# Patient Record
Sex: MALE | Race: WHITE | NOT HISPANIC OR LATINO | Employment: FULL TIME | URBAN - METROPOLITAN AREA
[De-identification: names, ages, dates, MRNs, and addresses within clinical notes are randomized per-mention and may not be internally consistent; named-entity substitution may affect disease eponyms.]

---

## 2017-02-08 ENCOUNTER — TRANSFERRED RECORDS (OUTPATIENT)
Dept: HEALTH INFORMATION MANAGEMENT | Facility: CLINIC | Age: 52
End: 2017-02-08

## 2017-02-15 ENCOUNTER — CARE COORDINATION (OUTPATIENT)
Dept: CARE COORDINATION | Facility: CLINIC | Age: 52
End: 2017-02-15

## 2017-02-15 NOTE — LETTER
[facility]  [address] [place] [address]  [phone]       February 15, 2017      Chas Scherer  [address] [facility] [address]        Dear Chas,    I am a SW Care Coordinator, working with the care team at your clinic including your primary care provider,Tiff Caro.  I have been trying to reach you to introduce you to [facility] Care Coordination Program. The Care Coordinator is a nurse or  who understands the health care system. The goal of Care Coordination is to help you manage your health and improve access to the [facility] system in the most efficient manner.      The registered nurse (RN) assists you in meeting your health care goals by providing education, coordinating services, and strengthening the communication among your providers. The  (SW) is available to assist in financial, behavioral, psychosocial, and chemical dependency and counseling/psychiatric resources.     Please feel free to contact me at [phone]. I look forward to your call and partnering with you to achieve your optimal state of wellness.  We at [facility] are focused on providing you with the highest-quality healthcare experience possible and that all starts with you.       Sincerely,             LUIS A Roland  Care Coordination  [facility] [place]  [phone]  [email or web address]

## 2017-02-15 NOTE — LETTER
My Access Plan    Presenting Problem Signs and Symptoms Treatment Plan    Questions or concerns during clinic hours    I will call the clinic directly:    [facility]:   [address] [place] [address]  [phone]       Questions or concerns outside clinic hours    I will call the 24 hour nurse line at [phone]    Patient needs to schedule an appointment    I will call the 24 hour scheduling team at [phone] or clinic directly at [phone]    Same day treatment     I will call the clinic first, nurse line if after hours, urgent care and express care if needed   Clinic Care Coordinators (RN/Social Work):     LUIS A Guzmán RN RN: [phone]    LUIS A Roland     [phone]       Crisis Services: Behavioral or Mental Health    Crisis Connection (24/7): [phone]    [facility] Crisis Response:  [phone]     Emergency treatment--Immediately    CAll 911

## 2017-02-15 NOTE — PROGRESS NOTES
Clinic Care Coordination Contact  [facility]/Voicemail    Referral Source: Non-[facility]  Clinical Data: Care Coordinator Outreach  Outreach attempted x 1.  Left message on voicemail with call back information and requested return call.  Plan: Care Coordinator will mail out care coordination introduction letter with care coordinator contact information and explanation of care coordination services. Care Coordinator will try to reach patient again in 3-5 business days.    LUIS A Roland  Care Coordination  [facility] [place]  [phone]  [email or web address]

## 2017-04-12 ENCOUNTER — MYC REFILL (OUTPATIENT)
Dept: FAMILY MEDICINE | Facility: CLINIC | Age: 52
End: 2017-04-12

## 2017-04-12 DIAGNOSIS — G62.1 ALCOHOL-INDUCED POLYNEUROPATHY (H): ICD-10-CM

## 2017-04-12 DIAGNOSIS — I10 BENIGN ESSENTIAL HYPERTENSION: ICD-10-CM

## 2017-04-12 DIAGNOSIS — G89.29 CHRONIC LOW BACK PAIN WITHOUT SCIATICA, UNSPECIFIED BACK PAIN LATERALITY: ICD-10-CM

## 2017-04-12 DIAGNOSIS — F43.22 ADJUSTMENT DISORDER WITH ANXIOUS MOOD: ICD-10-CM

## 2017-04-12 DIAGNOSIS — M54.50 CHRONIC LOW BACK PAIN WITHOUT SCIATICA, UNSPECIFIED BACK PAIN LATERALITY: ICD-10-CM

## 2017-04-12 DIAGNOSIS — R05.9 COUGH: ICD-10-CM

## 2017-04-12 RX ORDER — LISINOPRIL AND HYDROCHLOROTHIAZIDE 12.5; 2 MG/1; MG/1
1 TABLET ORAL DAILY
Qty: 90 TABLET | Refills: 0 | Status: SHIPPED | OUTPATIENT
Start: 2017-04-12 | End: 2017-06-13

## 2017-04-12 RX ORDER — GABAPENTIN 300 MG/1
CAPSULE ORAL
Qty: 360 CAPSULE | Refills: 1 | Status: SHIPPED | OUTPATIENT
Start: 2017-04-12 | End: 2017-09-06

## 2017-04-12 RX ORDER — BUPROPION HYDROCHLORIDE 150 MG/1
TABLET, EXTENDED RELEASE ORAL
Qty: 180 TABLET | Refills: 0 | Status: CANCELLED | OUTPATIENT
Start: 2017-04-12

## 2017-04-12 RX ORDER — CYCLOBENZAPRINE HCL 10 MG
10 TABLET ORAL
Qty: 30 TABLET | Refills: 0 | Status: SHIPPED | OUTPATIENT
Start: 2017-04-12 | End: 2017-07-20

## 2017-04-12 NOTE — TELEPHONE ENCOUNTER
Message from [employer]:  Original authorizing provider: KAMILLA CONDON NP, APRN CNP    Chas Scherer would like a refill of the following medications:  buPROPion (WELLBUTRIN SR) 150 MG 12 hr tablet [KAMILLA CONDON NP, APRN CNP]    [facility] [place] [address] [facility] [address]    Comment:      Medication renewals requested in this message routed to other providers:  lisinopril-hydrochlorothiazide (PRINZIDE,ZESTORETIC) 20-12.5 MG per tablet [Ke Hope PA-C]  cyclobenzaprine (FLEXERIL) 10 MG tablet [Ke Hope PA-C]  gabapentin (NEURONTIN) 300 MG capsule [Pako Jenkins MD]  albuterol (PROAIR HFA, PROVENTIL HFA, VENTOLIN HFA) 108 (90 BASE) MCG/ACT inhaler [Joann Mcgill DO]

## 2017-04-12 NOTE — TELEPHONE ENCOUNTER
Lisinopril-HCTZ refill request  Flexeril refill request  Last OV Ke Hope PA-C: 10/2016 for low back pain (chronic condition).  Had had physical 6/2016      Potassium   Date Value Ref Range Status   06/17/2016 3.8 3.4 - 5.3 mmol/L Final     Creatinine   Date Value Ref Range Status   06/17/2016 0.92 0.66 - 1.25 mg/dL Final     BP Readings from Last 3 Encounters:   11/12/16 132/70   10/06/16 138/84   09/13/16 (!) 144/92

## 2017-04-12 NOTE — TELEPHONE ENCOUNTER
[facility]:  Original authorizing provider: MD Chas Martínez would like a refill of the following medications:  gabapentin (NEURONTIN) 300 MG capsule [Pako Jenkins MD]    Preferred pharmacy: [place] [address] [address] [facility], [address]    Comment:      Medication renewals requested in this message routed to other providers:  lisinopril-hydrochlorothiazide (PRINZIDE,ZESTORETIC) 20-12.5 MG per tablet [Ke Hope PA-C]  cyclobenzaprine (FLEXERIL) 10 MG tablet [Ke Hope PA-C]  buPROPion (WELLBUTRIN SR) 150 MG 12 hr tablet [KAMILLA CONDON NP, APRN CNP]  albuterol (PROAIR HFA, PROVENTIL HFA, VENTOLIN HFA) 108 (90 BASE) MCG/ACT inhaler [Joann Mcgill DO]

## 2017-04-12 NOTE — TELEPHONE ENCOUNTER
Message from [employer]:  Original authorizing provider: DO Chas [place] would like a refill of the following medications:  albuterol (PROAIR HFA, PROVENTIL HFA, VENTOLIN HFA) 108 (90 BASE) MCG/ACT inhaler [Joann Mcgill DO]    Preferred pharmacy: [address], [address]    Comment:      Medication renewals requested in this message routed to other providers:  lisinopril-hydrochlorothiazide (PRINZIDE,ZESTORETIC) 20-12.5 MG per tablet [Ke Hope PA-C]  cyclobenzaprine (FLEXERIL) 10 MG tablet [Ke Hope PA-C]  buPROPion (WELLBUTRIN SR) 150 MG 12 hr tablet [KAMILLA CONDON NP, APRN CNP]  gabapentin (NEURONTIN) 300 MG capsule [Pako Jenkins MD]

## 2017-04-12 NOTE — TELEPHONE ENCOUNTER
Message from [employer]:  Original authorizing provider: NAUN Muñiz would like a refill of the following medications:  lisinopril-hydrochlorothiazide (PRINZIDE,ZESTORETIC) 20-12.5 MG per tablet [Ke Hope PA-C]  cyclobenzaprine (FLEXERIL) 10 MG tablet [Ke Hope PA-C]    Preferred pharmacy: [place] [address] [address] [facility], [address]    Comment:      Medication renewals requested in this message routed to other providers:  buPROPion (WELLBUTRIN SR) 150 MG 12 hr tablet [KAMILLA CONDON NP, APRN CNP]  gabapentin (NEURONTIN) 300 MG capsule [Pako Jenkins MD]  albuterol (PROAIR HFA, PROVENTIL HFA, VENTOLIN HFA) 108 (90 BASE) MCG/ACT inhaler [Joann Mcgill DO]

## 2017-04-13 RX ORDER — BUPROPION HYDROCHLORIDE 150 MG/1
TABLET, EXTENDED RELEASE ORAL
Qty: 180 TABLET | Refills: 0 | OUTPATIENT
Start: 2017-04-13

## 2017-04-13 RX ORDER — ALBUTEROL SULFATE 90 UG/1
2 AEROSOL, METERED RESPIRATORY (INHALATION) EVERY 6 HOURS PRN
Qty: 1 INHALER | Refills: 0 | Status: SHIPPED | OUTPATIENT
Start: 2017-04-13 | End: 2021-03-25

## 2017-04-18 ENCOUNTER — TELEPHONE (OUTPATIENT)
Dept: FAMILY MEDICINE | Facility: CLINIC | Age: 52
End: 2017-04-18

## 2017-04-18 NOTE — TELEPHONE ENCOUNTER
Panel Management Review      Patient has the following on his problem list: None      Composite cancer screening  Chart review shows that this patient is due/due soon for the following Colonoscopy  Summary:    Patient is due/failing the following:   COLONOSCOPY    Action needed:   Patient needs referral/order: colon    Type of outreach:    Sent letter.    Questions for provider review:    None                                                                                                                                    CARLOS ALBERTO Denton   10:16 AM  4/18/2017       Chart routed to closed .

## 2017-04-18 NOTE — LETTER
[facility]  [address] [facility] [address]  Phone: [phone]    04/18/17    Chas Scherer  [address] [facility] [address]      To whom it may concern:     Our records indicate that you have not scheduled for a(n)colonoscopy which was recommended by your health care team. Monitoring and managing your preventative and chronic health conditions are very important to us.     If you have received your health care elsewhere, please provide us with that information so it can be documented in your chart.    Please call [phone] or message us through your [employer] account to schedule an appointment or provide information for your chart.     I look forward to seeing you and working with you on your health care needs.       *If you have already scheduled an appointment, please disregard this reminder    Sincerely,      Ke Hope PA-C

## 2017-05-26 ENCOUNTER — TELEPHONE (OUTPATIENT)
Dept: CARE COORDINATION | Facility: CLINIC | Age: 52
End: 2017-05-26

## 2017-05-26 ENCOUNTER — CARE COORDINATION (OUTPATIENT)
Dept: CARE COORDINATION | Facility: CLINIC | Age: 52
End: 2017-05-26

## 2017-05-26 DIAGNOSIS — F10.939 ALCOHOL WITHDRAWAL (H): Primary | ICD-10-CM

## 2017-05-26 NOTE — LETTER
[facility]  [address] [place] [address]  [phone]   May 26, 2017      Chas Scherer  [address] [facility] [address]        Dear Chas,    I am a SW Care Coordinator, working with the care team at your clinic including your primary care provider,Tiff Caro.  I have been trying to reach you to introduce you to [facility] Care Coordination Program. The Care Coordinator is a nurse or  who understands the health care system. The goal of Care Coordination is to help you manage your health and improve access to the [facility] system in the most efficient manner.      The registered nurse (RN) assists you in meeting your health care goals by providing education, coordinating services, and strengthening the communication among your providers. The  (SW) is available to assist in financial, behavioral, psychosocial, and chemical dependency and counseling/psychiatric resources.     Please feel free to contact me at [phone]. I look forward to your call and partnering with you to achieve your optimal state of wellness.  We at [facility] are focused on providing you with the highest-quality healthcare experience possible and that all starts with you.       Sincerely,         LUIS A Roland  Care Coordination  [facility] [place]  [phone]  [email or web address]

## 2017-05-26 NOTE — TELEPHONE ENCOUNTER
DC'd from [facility] on 5/25 to Rehab facility  Primary Problem: Alcohol withdrawal  LOS: 1.8

## 2017-05-26 NOTE — PROGRESS NOTES
Clinic Care Coordination Contact  [facility]/Voicemail    Referral Source: Non-[facility]  Clinical Data: Care Coordinator Outreach  Outreach attempted x 1.  Left message on voicemail with call back information and requested return call.  Plan: Care Coordinator mailed out care coordination introduction letter on 2/2017, welcome packet will be mailed again. Care Coordinator will try to reach patient again in 3-5 business days.    LUIS A Roland  Care Coordination  [place]  [phone]  [email or web address]

## 2017-06-07 ENCOUNTER — CARE COORDINATION (OUTPATIENT)
Dept: CARE COORDINATION | Facility: CLINIC | Age: 52
End: 2017-06-07

## 2017-06-07 ENCOUNTER — TELEPHONE (OUTPATIENT)
Dept: CARE COORDINATION | Facility: CLINIC | Age: 52
End: 2017-06-07

## 2017-06-07 DIAGNOSIS — F10.10 ALCOHOL ABUSE: Primary | ICD-10-CM

## 2017-06-07 NOTE — PROGRESS NOTES
Clinic Care Coordination Contact  Post-Hospital Follow up    SW contacted pt following recent hospital stay for alcohol abuse. Pt has been contacted previously on both 2/15 and 5/26 for d/c follow up with the same diagnosis.     Pt stated he understood his discharge instructions and did not need care coordination at this time. Pt is asked for assistance with getting an appointment with provider.    SW will forward request and be available if pt has needs going forward.    LUIS A Roland  Care Coordination  [place]  [phone]  [email or web address]

## 2017-06-07 NOTE — TELEPHONE ENCOUNTER
DC'd from [facility] on 6/6 to home  Primary Problem: Alcohol abuse  LOS: 12.2

## 2017-06-08 NOTE — PROGRESS NOTES
Called and spoke to patient. Appointment made for next week with Ke Hope.Princess Molina MA/LINDY

## 2017-06-13 DIAGNOSIS — I10 BENIGN ESSENTIAL HYPERTENSION: ICD-10-CM

## 2017-06-14 ENCOUNTER — OFFICE VISIT (OUTPATIENT)
Dept: FAMILY MEDICINE | Facility: CLINIC | Age: 52
End: 2017-06-14
Payer: MEDICAID

## 2017-06-14 ENCOUNTER — TELEPHONE (OUTPATIENT)
Dept: BEHAVIORAL HEALTH | Facility: CLINIC | Age: 52
End: 2017-06-14

## 2017-06-14 VITALS
BODY MASS INDEX: 42.66 KG/M2 | HEIGHT: 72 IN | OXYGEN SATURATION: 100 % | SYSTOLIC BLOOD PRESSURE: 120 MMHG | HEART RATE: 68 BPM | WEIGHT: 315 LBS | DIASTOLIC BLOOD PRESSURE: 81 MMHG

## 2017-06-14 DIAGNOSIS — F10.10 ALCOHOL ABUSE: Primary | ICD-10-CM

## 2017-06-14 DIAGNOSIS — F43.22 ADJUSTMENT DISORDER WITH ANXIOUS MOOD: ICD-10-CM

## 2017-06-14 DIAGNOSIS — K21.9 GASTROESOPHAGEAL REFLUX DISEASE, ESOPHAGITIS PRESENCE NOT SPECIFIED: ICD-10-CM

## 2017-06-14 DIAGNOSIS — G47.00 INSOMNIA, UNSPECIFIED TYPE: ICD-10-CM

## 2017-06-14 PROCEDURE — 99214 OFFICE O/P EST MOD 30 MIN: CPT | Performed by: PHYSICIAN ASSISTANT

## 2017-06-14 RX ORDER — TRAZODONE HYDROCHLORIDE 50 MG/1
50 TABLET, FILM COATED ORAL
Qty: 30 TABLET | Refills: 1 | Status: SHIPPED | OUTPATIENT
Start: 2017-06-14 | End: 2017-09-01

## 2017-06-14 RX ORDER — PANTOPRAZOLE SODIUM 40 MG/1
40 TABLET, DELAYED RELEASE ORAL DAILY
Qty: 30 TABLET | Refills: 1 | Status: SHIPPED | OUTPATIENT
Start: 2017-06-14 | End: 2017-09-01

## 2017-06-14 RX ORDER — LISINOPRIL AND HYDROCHLOROTHIAZIDE 12.5; 2 MG/1; MG/1
1 TABLET ORAL DAILY
Qty: 90 TABLET | Refills: 1 | Status: SHIPPED | OUTPATIENT
Start: 2017-06-14 | End: 2017-11-29

## 2017-06-14 RX ORDER — DISULFIRAM 500 MG/1
500 TABLET ORAL
COMMUNITY
Start: 2017-06-06 | End: 2017-07-06

## 2017-06-14 ASSESSMENT — ANXIETY QUESTIONNAIRES
7. FEELING AFRAID AS IF SOMETHING AWFUL MIGHT HAPPEN: SEVERAL DAYS
2. NOT BEING ABLE TO STOP OR CONTROL WORRYING: NEARLY EVERY DAY
GAD7 TOTAL SCORE: 13
3. WORRYING TOO MUCH ABOUT DIFFERENT THINGS: NEARLY EVERY DAY
5. BEING SO RESTLESS THAT IT IS HARD TO SIT STILL: SEVERAL DAYS
6. BECOMING EASILY ANNOYED OR IRRITABLE: NEARLY EVERY DAY
IF YOU CHECKED OFF ANY PROBLEMS ON THIS QUESTIONNAIRE, HOW DIFFICULT HAVE THESE PROBLEMS MADE IT FOR YOU TO DO YOUR WORK, TAKE CARE OF THINGS AT HOME, OR GET ALONG WITH OTHER PEOPLE: EXTREMELY DIFFICULT
1. FEELING NERVOUS, ANXIOUS, OR ON EDGE: SEVERAL DAYS

## 2017-06-14 ASSESSMENT — PATIENT HEALTH QUESTIONNAIRE - PHQ9: 5. POOR APPETITE OR OVEREATING: SEVERAL DAYS

## 2017-06-14 NOTE — TELEPHONE ENCOUNTER
[facility]  No Data Recorded    Social Work Care Navigator Note    Patient: Chas Scherer  Date: June 14, 2017  Preferred Name: Chas    Previous PHQ-9:   PHQ-9 SCORE 3/7/2016 6/14/2017   Total Score 3 9     Previous AGNES-7:   AGNES-7 SCORE 4/14/2014 7/1/2014 6/14/2017   Total Score 3 1 -   Total Score - - 13     NELIDA LEVEL:  NELIDA Score (Last Two) 4/12/2011   NELIDA Raw Score 44   Activation Score 70.8   NELIDA Level 4     Preferred Contact:  No Data Recorded    Type of Contact Today: Phone call (not reached/unavailable)    Data: (subjective / Objective):      Patient PCP approached writer with patient as warm hand off and discussed concerns. Writer attempted to contact patient to discuss [facility] service and how I could be of help, writer left VM requesting phone call back & will continue to do outreach or attempt to connect with patient at next clinic encounter.     Agustin Conti, CASSIEW, SWCC

## 2017-06-14 NOTE — PROGRESS NOTES
SUBJECTIVE:                                                    Chas Scherer is a 51 year old male who presents to clinic today for the following health issues:        Hospital Follow-up Visit:    Hospital/Nursing Home/IP Rehab Facility: [facility]   Date of Admission: 5/23/17  Date of Discharge: 6/2/17?  Reason(s) for Admission: alcohol withdrawal, seizure. Was placed in detox/treatment program             Problems taking medications regularly:  None       Medication changes since discharge: see updated med list. Pt was prescribed Trazodone and Pantoprazole but was not given a prescription for these        Problems adhering to non-medication therapy:  None except the two listed above that he does not have     Summary of hospitalization:  [facility] discharge summary reviewed  CareEverywhere information obtained and reviewed    DISCHARGE SUMMARY    Transfer 2 east 5/25/17.  ADMIT DATE: 05/23/2017     HISTORY OF PRESENT ILLNESS   This is a 51-year-old male who presents for detox. The patient has a history of chronic alcohol abuse. He recently was discharged from treatment. Was sober until the next day and then started drinking. He has been drinking up to a fifth of vodka a day for about the last 2 weeks. This morning he found himself on the bathroom floor unaware of how he got there. He has a history of withdrawal seizures. He is interested in detox and quitting drinking. He has been on treatment on 3 occasions in the past. He has noted a little bit of chills and sweats, occasional heartburn, and lower extremity edema, but otherwise denies any other complaints or problems.     Subjective: Shaky, history withdrawal seizures. Found self BR floor, unsure how he got there. Came in for detox.      + heartburn last night, tums helped -better. No chest pain, shortness of breath, nausea or vomiting, abdominal pain, bowel or bladder complaints, No new complaints. I have personally reviewed chart to date and all  pertinent old records.     Exam:     Temp (24hrs), Av.2  F (36.8  C), Min:97.7  F (36.5  C), Max:98.9  F (37.2  C)  /90  Pulse 71  Temp 97.8  F (36.6  C)  Resp 20  Ht 1.829 m (6')  Wt (!) 146.8 kg (323 lb 9.6 oz)  SpO2 98%  BMI 43.89 kg/m2  Body mass index is 44.08 kg/(m^2)., Estimated Creatinine Clearance: 162.9 mL/min (by C-G formula based on Cr of 0.79).     Weight:  First encounter weight: Wt.: (!) 147.4 kg (325 lb) (17 1630) Weight Method: STATED (17 1630)       Patient Vitals for the past 48 hrs:    Weight   17 0638 (!) 146.8 kg (323 lb 9.6 oz)   17 2130 (!) 145.1 kg (319 lb 12.8 oz)   17 1630 (!) 147.4 kg (325 lb)       Diagnostic Tests/Treatments reviewed.  Follow up needed: none  Other Healthcare Providers Involved in Patient s Care:         None  Update since discharge: stable.       Post Discharge Medication Reconciliation: discharge medications reconciled and changed, per note/orders (see AVS).  Plan of care communicated with patient     Coding guidelines for this visit:  Type of Medical   Decision Making Face-to-Face Visit       within 7 Days of discharge Face-to-Face Visit        within 14 days of discharge   Moderate Complexity [id number]   High Complexity [id number]            Problem list and histories reviewed & adjusted, as indicated.  Additional history: as documented    States he lost his job in Nov and increase depression and ETOH usage. Been in ED x2 in the last 1 month. Has started out patient tx last Monday.     Patient Active Problem List   Diagnosis     Gastric bypass status for obesity     Shoulder impingement syndrome     CARDIOVASCULAR SCREENING; LDL GOAL LESS THAN 130     Tobacco abuse     Other joint derangement, not elsewhere classified, shoulder region     Labral tear of shoulder     Stress at work     Generalized anxiety disorder     health care home     Left ankle pain     Hypertension goal BP (blood pressure) < 140/90     Heel spur      Advanced directives, counseling/discussion     Adjustment disorder with anxious mood     Benign essential hypertension     Chest pain, unspecified type     Pulmonary nodules     Alcohol abuse     Chronic low back pain without sciatica, unspecified back pain laterality     Alcohol-induced polyneuropathy (H)     Chronic midline low back pain without sciatica     BMI 40.0-44.9, adult (H)     Gastroesophageal reflux disease, esophagitis presence not specified     Insomnia, unspecified type     No past surgical history on file.    Social History   Substance Use Topics     Smoking status: Current Some Day Smoker     Packs/day: 0.50     Years: 5.00     Types: Cigarettes     Smokeless tobacco: Former User     Quit date: 2/2/2014     Alcohol use No      Comment: quit 9-2009     Family History   Problem Relation Age of Onset     Arthritis Mother      CANCER Mother      Lung ca     Alcohol/Drug Father      DIABETES Brother      Alcohol/Drug Brother      GASTROINTESTINAL DISEASE Brother      CANCER Brother      CANCER Brother      Lung ca     Obesity Brother          Current Outpatient Prescriptions   Medication Sig Dispense Refill     Disulfiram 500 MG TABS Take 500 mg by mouth       THIAMINE HCL PO Take 50 mg by mouth daily       pantoprazole (PROTONIX) 40 MG EC tablet Take 1 tablet (40 mg) by mouth daily Take 30-60 minutes before a meal. 30 tablet 1     traZODone (DESYREL) 50 MG tablet Take 1 tablet (50 mg) by mouth nightly as needed for sleep 30 tablet 1     sertraline (ZOLOFT) 50 MG tablet Take 1 tablet (50 mg) by mouth daily 90 tablet 1     lisinopril-hydrochlorothiazide (PRINZIDE/ZESTORETIC) 20-12.5 MG per tablet Take 1 tablet by mouth daily 90 tablet 0     cyclobenzaprine (FLEXERIL) 10 MG tablet Take 1 tablet (10 mg) by mouth nightly as needed for muscle spasms 30 tablet 0     gabapentin (NEURONTIN) 300 MG capsule One tab at bedtime x2 days, then one tab twice daily x2 days, then 2 tabs at bedtime and one tab in am.  (Patient taking differently: 2 tab 3 times a day) 360 capsule 1     Cholecalciferol (VITAMIN D PO) Take 1 tablet by mouth.       cyanocolbalamin (VITAMIN B-12) 500 MCG tablet Take 500 mcg by mouth daily.       albuterol (PROAIR HFA/PROVENTIL HFA/VENTOLIN HFA) 108 (90 BASE) MCG/ACT Inhaler Inhale 2 puffs into the lungs every 6 hours as needed for shortness of breath / dyspnea or wheezing Needs to be seen before next refill (Patient not taking: Reported on 6/14/2017) 1 Inhaler 0     No Known Allergies  BP Readings from Last 3 Encounters:   11/12/16 132/70   10/06/16 138/84   09/13/16 (!) 144/92    Wt Readings from Last 3 Encounters:   11/12/16 (!) 324 lb (147 kg)   10/06/16 (!) 324 lb 9.6 oz (147.2 kg)   09/13/16 (!) 331 lb (150.1 kg)                  Labs reviewed in EPIC    ROS:  Constitutional, HEENT, cardiovascular, pulmonary, gi and gu systems are negative, except as otherwise noted.    OBJECTIVE:                                                    There were no vitals taken for this visit.  There is no height or weight on file to calculate BMI.  GENERAL: healthy, alert and no distress  Head: Normocephalic, atraumatic.  Eyes: Conjunctiva clear, non icteric. PERRLA.  Ears: External ears and TMs normal BL.  Nose: Septum midline, nasal mucosa pink and moist. No discharge.  Mouth / Throat: Normal dentition.  No oral lesions. Pharynx non erythematous, tonsils without hypertrophy.  Neck: Supple, no enlarged LN, trachea midline.  Heart: S1 and S2 normal, no murmurs, clicks, gallops or rubs. Regular rate and rhythm. Chest: Clear; no wheezes or rales.  The abdomen is soft without tenderness, guarding, mass, rebound or organomegaly. Bowel sounds are normal.    Diagnostic Test Results:  none      ASSESSMENT/PLAN:                                                          ICD-10-CM    1. Alcohol abuse F10.10 CARE COORDINATION REFERRAL     MENTAL HEALTH REFERRAL   2. Adjustment disorder with anxious mood F43.22 sertraline  (ZOLOFT) 50 MG tablet     CARE COORDINATION REFERRAL     MENTAL HEALTH REFERRAL   3. Gastroesophageal reflux disease, esophagitis presence not specified K21.9 pantoprazole (PROTONIX) 40 MG EC tablet   4. Insomnia, unspecified type G47.00 traZODone (DESYREL) 50 MG tablet     SLEEP EVALUATION & MANAGEMENT REFERRAL - ADULT     MENTAL HEALTH REFERRAL   5. BMI 40.0-44.9, adult (H) Z68.41 SLEEP EVALUATION & MANAGEMENT REFERRAL - ADULT   1. Needs cont follow up  For compliance of care. Needs to follow up  With psychiatry or provider that RX antabuse that he is currently taking  2. Start zoloft. warning signs discussed. side effects discussed- recheck 4 wks.  3. Start protonix. warning signs discussed. side effects discussed  4-5. Follow up  With sleep medicine.     Ke Hope PA-C  [facility]

## 2017-06-14 NOTE — MR AVS SNAPSHOT
After Visit Summary   6/14/2017    Chas Scherer    MRN: [id number]           Patient Information     Date Of Birth          1965        Visit Information        Provider Department      6/14/2017 8:00 AM Ke Hope PA-C [facility]        Today's Diagnoses     Alcohol abuse    -  1    Adjustment disorder with anxious mood        Gastroesophageal reflux disease, esophagitis presence not specified        Insomnia, unspecified type        BMI 40.0-44.9, adult (H)           Follow-ups after your visit        Additional Services     CARE COORDINATION REFERRAL       Services are provided by a Care Coordinator for people with complex needs such as: medical, social, or financial troubles.  The Care Coordinator works with the patient and their Primary Care Provider to determine health goals, obtain resources, achieve outcomes, and develop care plans that help coordinate the patient's care.     Reason for Referral: Chemical Dependence: Other: ETOH    Provide additional details for Care Coordination to best meet the patient's current needs:     Clinical Staff have discussed the Care Coordination Referral with the patient and/or caregiver: yes            SLEEP EVALUATION & MANAGEMENT REFERRAL - ADULT       Please be aware that coverage of these services is subject to the terms and limitations of your health insurance plan.  Call member services at your health plan with any benefit or coverage questions.      Please bring the following to your appointment:    >>   List of current medications   >>   This referral request   >>   Any documents/labs given to you for this referral    [facility] [phone] (Age 15 and up)                  Follow-up notes from your care team     Return in about 4 weeks (around 7/12/2017) for depression check up.      Future tests that were ordered for you today     Open Future Orders        Priority Expected Expires Ordered     SLEEP EVALUATION & MANAGEMENT REFERRAL - ADULT Routine  6/14/2018 6/14/2017            Who to contact     If you have questions or need follow up information about today's clinic visit or your schedule please contact [facility] directly at [phone].  Normal or non-critical lab and imaging results will be communicated to you by MyChart, letter or phone within 4 business days after the clinic has received the results. If you do not hear from us within 7 days, please contact the clinic through [employer] or phone. If you have a critical or abnormal lab result, we will notify you by phone as soon as possible.  Submit refill requests through [employer] or call your pharmacy and they will forward the refill request to us. Please allow 3 business days for your refill to be completed.          Additional Information About Your Visit        [employer] Information     [employer] gives you secure access to your electronic health record. If you see a primary care provider, you can also send messages to your care team and make appointments. If you have questions, please call your primary care clinic.  If you do not have a primary care provider, please call [phone] and they will assist you.        Care EveryWhere ID     This is your Care EveryWhere ID. This could be used by other organizations to access your [facility] medical records  [id number]         Blood Pressure from Last 3 Encounters:   11/12/16 132/70   10/06/16 138/84   09/13/16 (!) 144/92    Weight from Last 3 Encounters:   11/12/16 (!) 324 lb (147 kg)   10/06/16 (!) 324 lb 9.6 oz (147.2 kg)   09/13/16 (!) 331 lb (150.1 kg)              We Performed the Following     CARE COORDINATION REFERRAL          Today's Medication Changes          These changes are accurate as of: 6/14/17  8:03 AM.  If you have any questions, ask your nurse or doctor.               Start taking these medicines.        Dose/Directions    pantoprazole 40 MG EC tablet   Commonly known  as:  PROTONIX   Used for:  Gastroesophageal reflux disease, esophagitis presence not specified        Dose:  40 mg   Take 1 tablet (40 mg) by mouth daily Take 30-60 minutes before a meal.   Quantity:  30 tablet   Refills:  1       sertraline 50 MG tablet   Commonly known as:  ZOLOFT   Used for:  Adjustment disorder with anxious mood        Dose:  50 mg   Take 1 tablet (50 mg) by mouth daily   Quantity:  90 tablet   Refills:  1       traZODone 50 MG tablet   Commonly known as:  DESYREL   Used for:  Insomnia, unspecified type        Dose:  50 mg   Take 1 tablet (50 mg) by mouth nightly as needed for sleep   Quantity:  30 tablet   Refills:  1         These medicines have changed or have updated prescriptions.        Dose/Directions    gabapentin 300 MG capsule   Commonly known as:  NEURONTIN   This may have changed:  additional instructions   Used for:  Alcohol-induced polyneuropathy (H)        One tab at bedtime x2 days, then one tab twice daily x2 days, then 2 tabs at bedtime and one tab in am.   Quantity:  360 capsule   Refills:  1            Where to get your medicines      These medications were sent to [facility] [address] [facility]  [address] [place] [address]     Phone:  [phone]     pantoprazole 40 MG EC tablet    sertraline 50 MG tablet    traZODone 50 MG tablet                Primary Care Provider Office Phone # Fax #    Ke Hope PA-C [phone]       [facility] [address] [facility] [address]        Thank you!     Thank you for choosing [facility]  for your care. Our goal is always to provide you with excellent care. Hearing back from our patients is one way we can continue to improve our services. Please take a few minutes to complete the written survey that you may receive in the mail after your visit with us. Thank you!             Your Updated Medication List -  Protect others around you: Learn how to safely use, store and throw away your medicines at www.disposemymeds.org.          This list is accurate as of: 6/14/17  8:03 AM.  Always use your most recent med list.                   Brand Name Dispense Instructions for use    albuterol 108 (90 BASE) MCG/ACT Inhaler    PROAIR HFA/PROVENTIL HFA/VENTOLIN HFA    1 Inhaler    Inhale 2 puffs into the lungs every 6 hours as needed for shortness of breath / dyspnea or wheezing Needs to be seen before next refill       cyanocolbalamin 500 MCG tablet    vitamin  B-12     Take 500 mcg by mouth daily.       cyclobenzaprine 10 MG tablet    FLEXERIL    30 tablet    Take 1 tablet (10 mg) by mouth nightly as needed for muscle spasms       Disulfiram 500 MG Tabs      Take 500 mg by mouth       gabapentin 300 MG capsule    NEURONTIN    360 capsule    One tab at bedtime x2 days, then one tab twice daily x2 days, then 2 tabs at bedtime and one tab in am.       lisinopril-hydrochlorothiazide 20-12.5 MG per tablet    PRINZIDE/ZESTORETIC    90 tablet    Take 1 tablet by mouth daily       pantoprazole 40 MG EC tablet    PROTONIX    30 tablet    Take 1 tablet (40 mg) by mouth daily Take 30-60 minutes before a meal.       sertraline 50 MG tablet    ZOLOFT    90 tablet    Take 1 tablet (50 mg) by mouth daily       THIAMINE HCL PO      Take 50 mg by mouth daily       traZODone 50 MG tablet    DESYREL    30 tablet    Take 1 tablet (50 mg) by mouth nightly as needed for sleep       VITAMIN D PO      Take 1 tablet by mouth.

## 2017-06-14 NOTE — NURSING NOTE
Chief Complaint   Patient presents with     Hospital F/U       Initial /81  Pulse 68  Ht 6' (1.829 m)  Wt (!) 323 lb (146.5 kg)  SpO2 100%  BMI 43.81 kg/m2 Estimated body mass index is 43.81 kg/(m^2) as calculated from the following:    Height as of this encounter: 6' (1.829 m).    Weight as of this encounter: 323 lb (146.5 kg).  Medication Reconciliation: feliz Gregg, CMA

## 2017-06-15 ASSESSMENT — PATIENT HEALTH QUESTIONNAIRE - PHQ9: SUM OF ALL RESPONSES TO PHQ QUESTIONS 1-9: 9

## 2017-06-15 ASSESSMENT — ANXIETY QUESTIONNAIRES: GAD7 TOTAL SCORE: 13

## 2017-07-10 PROBLEM — M10.9 ACUTE GOUTY ARTHRITIS: Status: RESOLVED | Noted: 2017-07-10 | Resolved: 2017-07-10

## 2017-07-10 PROBLEM — M10.9 ACUTE GOUTY ARTHRITIS: Status: ACTIVE | Noted: 2017-07-10

## 2017-07-20 DIAGNOSIS — M54.50 CHRONIC LOW BACK PAIN WITHOUT SCIATICA, UNSPECIFIED BACK PAIN LATERALITY: ICD-10-CM

## 2017-07-20 DIAGNOSIS — G89.29 CHRONIC LOW BACK PAIN WITHOUT SCIATICA, UNSPECIFIED BACK PAIN LATERALITY: ICD-10-CM

## 2017-07-20 RX ORDER — CYCLOBENZAPRINE HCL 10 MG
10 TABLET ORAL
Qty: 30 TABLET | Refills: 0 | Status: SHIPPED | OUTPATIENT
Start: 2017-07-20 | End: 2017-07-21

## 2017-07-21 RX ORDER — CYCLOBENZAPRINE HCL 10 MG
10 TABLET ORAL
Qty: 30 TABLET | Refills: 0 | Status: SHIPPED | OUTPATIENT
Start: 2017-07-21 | End: 2017-09-18

## 2017-07-31 ENCOUNTER — OFFICE VISIT (OUTPATIENT)
Dept: FAMILY MEDICINE | Facility: CLINIC | Age: 52
End: 2017-07-31

## 2017-07-31 VITALS
TEMPERATURE: 97.7 F | SYSTOLIC BLOOD PRESSURE: 127 MMHG | OXYGEN SATURATION: 100 % | WEIGHT: 311 LBS | HEART RATE: 79 BPM | DIASTOLIC BLOOD PRESSURE: 86 MMHG | BODY MASS INDEX: 42.12 KG/M2 | HEIGHT: 72 IN

## 2017-07-31 DIAGNOSIS — M25.551 HIP PAIN, RIGHT: Primary | ICD-10-CM

## 2017-07-31 PROCEDURE — 99213 OFFICE O/P EST LOW 20 MIN: CPT | Performed by: FAMILY MEDICINE

## 2017-07-31 RX ORDER — OXYCODONE AND ACETAMINOPHEN 5; 325 MG/1; MG/1
1-2 TABLET ORAL EVERY 6 HOURS PRN
COMMUNITY
End: 2017-07-31

## 2017-07-31 RX ORDER — OXYCODONE AND ACETAMINOPHEN 5; 325 MG/1; MG/1
1 TABLET ORAL
Qty: 14 TABLET | Refills: 0 | Status: SHIPPED | OUTPATIENT
Start: 2017-07-31 | End: 2017-08-07

## 2017-07-31 NOTE — MR AVS SNAPSHOT
After Visit Summary   7/31/2017    Chas Scherer    MRN: [id number]           Patient Information     Date Of Birth          1965        Visit Information        Provider Department      7/31/2017 2:30 PM Pako Jenkins MD [facility]        Today's Diagnoses     Hip pain, right    -  1       Follow-ups after your visit        Additional Services     ORTHOPEDICS ADULT REFERRAL       Your provider has referred you to: FMG: [facility] [phone]   [email or web address]  FM[facility]: [facility] [phone]   [email or web address]    Please be aware that coverage of these services is subject to the terms and limitations of your health insurance plan.  Call member services at your health plan with any benefit or coverage questions.      Please bring the following to your appointment:    >>   Any x-rays, CTs or MRIs which have been performed.  Contact the facility where they were done to arrange for  prior to your scheduled appointment.    >>   List of current medications   >>   This referral request   >>   Any documents/labs given to you for this referral                  Who to contact     If you have questions or need follow up information about today's clinic visit or your schedule please contact [facility] directly at [phone].  Normal or non-critical lab and imaging results will be communicated to you by MyChart, letter or phone within 4 business days after the clinic has received the results. If you do not hear from us within 7 days, please contact the clinic through MyChart or phone. If you have a critical or abnormal lab result, we will notify you by phone as soon as possible.  Submit refill requests through [employer] or call your pharmacy and they will forward the refill request to us. Please allow 3 business days for your refill to be completed.           Additional Information About Your Visit        SportsH[employer] Information     Store[employer] gives you secure access to your electronic health record. If you see a primary care provider, you can also send messages to your care team and make appointments. If you have questions, please call your primary care clinic.  If you do not have a primary care provider, please call [phone] and they will assist you.        Care EveryWhere ID     This is your Care EveryWhere ID. This could be used by other organizations to access your [place] medical records  [id number]        Your Vitals Were     Pulse Temperature Height Pulse Oximetry BMI (Body Mass Index)       79 97.7  F (36.5  C) (Oral) 6' (1.829 m) 100% 42.18 kg/m2        Blood Pressure from Last 3 Encounters:   07/31/17 127/86   06/14/17 120/81   11/12/16 132/70    Weight from Last 3 Encounters:   07/31/17 (!) 311 lb (141.1 kg)   06/14/17 (!) 323 lb (146.5 kg)   11/12/16 (!) 324 lb (147 kg)              We Performed the Following     ORTHOPEDICS ADULT REFERRAL          Today's Medication Changes          These changes are accurate as of: 7/31/17  3:50 PM.  If you have any questions, ask your nurse or doctor.               These medicines have changed or have updated prescriptions.        Dose/Directions    gabapentin 300 MG capsule   Commonly known as:  NEURONTIN   This may have changed:  additional instructions   Used for:  Alcohol-induced polyneuropathy (H)        One tab at bedtime x2 days, then one tab twice daily x2 days, then 2 tabs at bedtime and one tab in am.   Quantity:  360 capsule   Refills:  1       oxyCODONE-acetaminophen 5-325 MG per tablet   Commonly known as:  PERCOCET   This may have changed:    - how much to take  - when to take this  - reasons to take this   Used for:  Hip pain, right   Changed by:  Pako Jenkins MD        Dose:  1 tablet   Take 1 tablet by mouth nightly as needed for moderate to severe pain or pain (avoid with alcohol)   Quantity:   14 tablet   Refills:  0            Where to get your medicines      Some of these will need a paper prescription and others can be bought over the counter.  Ask your nurse if you have questions.     Bring a paper prescription for each of these medications     oxyCODONE-acetaminophen 5-325 MG per tablet                Primary Care Provider Office Phone # Fax #    Ke Hope PA-C [phone]       [facility] [id number] [place] [address]        Equal Access to Services     JOSEFINA LOYD : Hadii aad ku hadasho Soomaali, waaxda luqadaha, qaybta kaalmada adeegyada, waxay idiin hayaan adeeg kharash lanicolas gonzalez. So [facility] [phone].    ATENCIÓN: Si habla español, tiene a valadez disposición servicios gratuitos de asistencia lingüística. [facility] [phone].    We comply with applicable federal civil rights laws and [place] laws. We do not discriminate on the basis of race, color, national origin, age, disability sex, sexual orientation or gender identity.            Thank you!     Thank you for choosing [facility]  for your care. Our goal is always to provide you with excellent care. Hearing back from our patients is one way we can continue to improve our services. Please take a few minutes to complete the written survey that you may receive in the mail after your visit with us. Thank you!             Your Updated Medication List - Protect others around you: Learn how to safely use, store and throw away your medicines at www.disposemymeds.org.          This list is accurate as of: 7/31/17  3:50 PM.  Always use your most recent med list.                   Brand Name Dispense Instructions for use Diagnosis    albuterol 108 (90 BASE) MCG/ACT Inhaler    PROAIR HFA/PROVENTIL HFA/VENTOLIN HFA    1 Inhaler    Inhale 2 puffs into the lungs every 6 hours as needed for shortness of breath / dyspnea or wheezing Needs to be seen before next refill    Cough       cyanocolbalamin  500 MCG tablet    vitamin  B-12     Take 500 mcg by mouth daily.        cyclobenzaprine 10 MG tablet    FLEXERIL    30 tablet    Take 1 tablet (10 mg) by mouth nightly as needed for muscle spasms    Chronic low back pain without sciatica, unspecified back pain laterality       gabapentin 300 MG capsule    NEURONTIN    360 capsule    One tab at bedtime x2 days, then one tab twice daily x2 days, then 2 tabs at bedtime and one tab in am.    Alcohol-induced polyneuropathy (H)       lisinopril-hydrochlorothiazide 20-12.5 MG per tablet    PRINZIDE/ZESTORETIC    90 tablet    Take 1 tablet by mouth daily    Benign essential hypertension       oxyCODONE-acetaminophen 5-325 MG per tablet    PERCOCET    14 tablet    Take 1 tablet by mouth nightly as needed for moderate to severe pain or pain (avoid with alcohol)    Hip pain, right       pantoprazole 40 MG EC tablet    PROTONIX    30 tablet    Take 1 tablet (40 mg) by mouth daily Take 30-60 minutes before a meal.    Gastroesophageal reflux disease, esophagitis presence not specified       PREDNISONE PO      Take 20 mg by mouth daily X 5 days        sertraline 50 MG tablet    ZOLOFT    90 tablet    Take 1 tablet (50 mg) by mouth daily    Adjustment disorder with anxious mood       THIAMINE HCL PO      Take 50 mg by mouth daily        traZODone 50 MG tablet    DESYREL    30 tablet    Take 1 tablet (50 mg) by mouth nightly as needed for sleep    Insomnia, unspecified type       VITAMIN D PO      Take 1 tablet by mouth.

## 2017-07-31 NOTE — PROGRESS NOTES
SUBJECTIVE:                                                    Chas Scherer is a 51 year old male who presents to clinic today for the following health issues:  [place]  History broken finger as youth. Drinking milk.  Some hip pain in past with weight. Pain about the same. Pain meds helpful. Percocet helpful at night. Fell of ladder directly onto hip. Hip painful with laying down - into groin and some knee pain posterior. Icing and elevation.   No orthopedist. No metal in body. No insurance currently. No chest pain or shortness of breath. No fevers or chills. No bm/bladder changes. Working on insurance.    Per ED note:    This is a 52 yo who presents with right hip pain and knee pain, secondary to fall. The X rays are abnormal wit probably ligamentous / cartilage injury to the knee, as wel as possible acetabular fracture. He needs MRIs of these joints, but it was unclear if he was currently covered by insurance, so decided to wait on them. He will take some pain mediation as will try to rest these joints over the weekend. He wishes to see his primary care provider associated with [facility] next week and perhaps have an MRI ordered through him.   Plan: Use a cane or crutches ( he has both at home ), frequent elevation, Percocet 1 tablet qid prn. He understands the addictive nature of this medication, and denies frequent usage or previous history of dependency      Medical, social, surgical, and family histories reviewed.    ROS:    OBJECTIVE:  /86  Pulse 79  Temp 97.7  F (36.5  C) (Oral)  Ht 6' (1.829 m)  Wt (!) 311 lb (141.1 kg)  SpO2 100%  BMI 42.18 kg/m2  EXAM:  GENERAL APPEARANCE: healthy, alert and no distress  RESP: lungs clear to auscultation - no rales, rhonchi or wheezes  CV: regular rates and rhythm, normal S1 S2, no S3 or S4 and no murmur, click or rub -  MS: extremities normal- no gross deformities noted, no evidence of inflammation in joints, FROM  in all extremities.  MS: pain with RANGE OF MOTION right hip. Good RANGE OF MOTION knee but some swelling right knee joint.   SKIN: no suspicious lesions or rashes  PSYCH: mentation appears normal and affect normal/bright    ASSESSMENT/PLAN:  (M25.551) Hip pain, right  (primary encounter diagnosis)  Comment: concerns about possible fracture vs strain. Not worse  Plan: oxyCODONE-acetaminophen (PERCOCET) 5-325 MG per        tablet, ORTHOPEDICS ADULT REFERRAL        Follow-up ortho. Discussed mri - patient working on insurance and would like to see ortho first. Will at least need repeat xray in next 1-2 weeks. Expected course and warning signs reviewed. Call/email with questions/concerns. Limited nsaids prn but ok for tylenol/ice and percocet at bedtime- avoid with ALCOHOL. Reveiwed risks and side effects of medication    Pako Jenkins MD

## 2017-07-31 NOTE — NURSING NOTE
Chief Complaint   Patient presents with     Fall     off ladder/ last monday     Knee right     Hip right       Initial /86  Pulse 79  Temp 97.7  F (36.5  C) (Oral)  Ht 6' (1.829 m)  Wt (!) 311 lb (141.1 kg)  SpO2 100%  BMI 42.18 kg/m2 Estimated body mass index is 42.18 kg/(m^2) as calculated from the following:    Height as of this encounter: 6' (1.829 m).    Weight as of this encounter: 311 lb (141.1 kg).  Medication Reconciliation: complete   Katy Hernandez, CMA

## 2017-08-07 ENCOUNTER — OFFICE VISIT (OUTPATIENT)
Dept: ORTHOPEDICS | Facility: CLINIC | Age: 52
End: 2017-08-07

## 2017-08-07 VITALS
SYSTOLIC BLOOD PRESSURE: 132 MMHG | WEIGHT: 310 LBS | HEART RATE: 69 BPM | HEIGHT: 72 IN | OXYGEN SATURATION: 98 % | BODY MASS INDEX: 41.99 KG/M2 | DIASTOLIC BLOOD PRESSURE: 88 MMHG

## 2017-08-07 DIAGNOSIS — M54.41 CHRONIC BILATERAL LOW BACK PAIN WITH RIGHT-SIDED SCIATICA: ICD-10-CM

## 2017-08-07 DIAGNOSIS — G89.29 CHRONIC BILATERAL LOW BACK PAIN WITH RIGHT-SIDED SCIATICA: ICD-10-CM

## 2017-08-07 DIAGNOSIS — S79.911A HIP INJURY, RIGHT, INITIAL ENCOUNTER: ICD-10-CM

## 2017-08-07 DIAGNOSIS — M25.561 ACUTE PAIN OF RIGHT KNEE: ICD-10-CM

## 2017-08-07 DIAGNOSIS — M25.551 HIP PAIN, RIGHT: Primary | ICD-10-CM

## 2017-08-07 DIAGNOSIS — M16.11 PRIMARY OSTEOARTHRITIS OF RIGHT HIP: ICD-10-CM

## 2017-08-07 PROCEDURE — 99243 OFF/OP CNSLTJ NEW/EST LOW 30: CPT | Performed by: ORTHOPAEDIC SURGERY

## 2017-08-07 RX ORDER — NAPROXEN 500 MG/1
500 TABLET ORAL 2 TIMES DAILY PRN
Qty: 30 TABLET | Refills: 1 | Status: SHIPPED | OUTPATIENT
Start: 2017-08-07 | End: 2017-09-18

## 2017-08-07 RX ORDER — OXYCODONE AND ACETAMINOPHEN 5; 325 MG/1; MG/1
1 TABLET ORAL
Qty: 14 TABLET | Refills: 0 | Status: SHIPPED | OUTPATIENT
Start: 2017-08-07 | End: 2017-09-06

## 2017-08-07 ASSESSMENT — PAIN SCALES - GENERAL: PAINLEVEL: MODERATE PAIN (5)

## 2017-08-07 NOTE — MR AVS SNAPSHOT
After Visit Summary   8/7/2017    Chas Scherer    MRN: [id number]           Patient Information     Date Of Birth          1965        Visit Information        Provider Department      8/7/2017 8:45 AM Leonard Elias MD [facility] [place]        Today's Diagnoses     Hip pain, right    -  1    Acute pain of right knee        Hip injury, right, initial encounter        Primary osteoarthritis of right hip        Chronic bilateral low back pain with right-sided sciatica          Care Instructions    Please remember to call and schedule a follow up appointment if one was recommended at your earliest convenience.  Orthopedics CLINIC HOURS TELEPHONE NUMBER   Dr. Curt Olivier  Certified Medical Assistant   Monday & Wednesday   8am - 5pm  Thursday 1pm - 5pm  Friday 8am -11:30am Specialty schedulers:   [phone] to schedule your surgery.  Main Clinic:   [phone] to make an appointment with any provider.    Urgent Care locations:    [facility] Monday-Friday Closed  Saturday-Sunday 9am-5pm      Monday-Friday 12pm - 8pm  Saturday-Sunday 9am-5pm [phone]     If SURGERY has been recommended, please call our Specialty Schedulers at [phone] to schedule your procedure.    If you need a medication refill, please contact your pharmacy. Please allow 3 business days for your refill to be completed.    If an MRI or CT scan has been recommended, please call [facility] Schedulers at [phone] to schedule your appointment.  Use [employer] (secure e-mail communication and access to your chart) to send a message or to make an appointment. Please ask how you can sign up for [employer].  Your care team's suggested websites for health information:   [email or web address] : Up to date and easily searchable information on multiple topics.   [email or web address] : [place] dept of heatl, public health issues in [place],  N1N1              Follow-ups after your visit        Follow-up notes from your care team     Return in about 2 weeks (around 8/21/2017) for clinical recheck.      Your next 10 appointments already scheduled     Aug 28, 2017  8:45 AM CDT   Return Visit with Leonard Elias MD   [facility] [place] ([facility] [place])    [address]  [place] [address] [phone]              Who to contact     If you have questions or need follow up information about today's clinic visit or your schedule please contact [facility] [place] directly at [phone].  Normal or non-critical lab and imaging results will be communicated to you by [employer], letter or phone within 4 business days after the clinic has received the results. If you do not hear from us within 7 days, please contact the clinic through [employer] or phone. If you have a critical or abnormal lab result, we will notify you by phone as soon as possible.  Submit refill requests through [employer] or call your pharmacy and they will forward the refill request to us. Please allow 3 business days for your refill to be completed.          Additional Information About Your Visit        MyChart Information     [employer] gives you secure access to your electronic health record. If you see a primary care provider, you can also send messages to your care team and make appointments. If you have questions, please call your primary care clinic.  If you do not have a primary care provider, please call [phone] and they will assist you.        Care EveryWhere ID     This is your Care EveryWhere ID. This could be used by other organizations to access your [facility] medical records  [id number]        Your Vitals Were     Pulse Height Pulse Oximetry BMI (Body Mass Index)          69 6' (1.829 m) 98% 42.04 kg/m2         Blood Pressure from Last 3 Encounters:   08/07/17 132/88   07/31/17 127/86    06/14/17 120/81    Weight from Last 3 Encounters:   08/07/17 (!) 310 lb (140.6 kg)   07/31/17 (!) 311 lb (141.1 kg)   06/14/17 (!) 323 lb (146.5 kg)              Today, you had the following     No orders found for display         Today's Medication Changes          These changes are accurate as of: 8/7/17 10:53 AM.  If you have any questions, ask your nurse or doctor.               Start taking these medicines.        Dose/Directions    naproxen 500 MG tablet   Commonly known as:  NAPROSYN   Used for:  Hip pain, right, Acute pain of right knee   Started by:  Leonard Elias MD        Dose:  500 mg   Take 1 tablet (500 mg) by mouth 2 times daily as needed for moderate pain   Quantity:  30 tablet   Refills:  1         These medicines have changed or have updated prescriptions.        Dose/Directions    gabapentin 300 MG capsule   Commonly known as:  NEURONTIN   This may have changed:  additional instructions   Used for:  Alcohol-induced polyneuropathy (H)        One tab at bedtime x2 days, then one tab twice daily x2 days, then 2 tabs at bedtime and one tab in am.   Quantity:  360 capsule   Refills:  1            Where to get your medicines      These medications were sent to [facility] - [address] [place]  [address] [place] [address]     Phone:  [phone]     naproxen 500 MG tablet         Some of these will need a paper prescription and others can be bought over the counter.  Ask your nurse if you have questions.     Bring a paper prescription for each of these medications     oxyCODONE-acetaminophen 5-325 MG per tablet                Primary Care Provider Office Phone # Fax #    Ke Hope PA-C [phone]       [facility] [address] [address] [address]        Equal Access to Services     JOSEFINA LOYD AH: Celine Huber, gladys norman, qafallonta kadesire shipley, carrie farrell  ah. So [facility] [phone].    ATENCIÓN: Si lilian gorman, tiene a valadez disposición servicios gratuitos de asistencia lingüística. Luz al [phone].    We comply with applicable federal civil rights laws and [place] laws. We do not discriminate on the basis of race, color, national origin, age, disability sex, sexual orientation or gender identity.            Thank you!     Thank you for choosing [facility]  for your care. Our goal is always to provide you with excellent care. Hearing back from our patients is one way we can continue to improve our services. Please take a few minutes to complete the written survey that you may receive in the mail after your visit with us. Thank you!             Your Updated Medication List - Protect others around you: Learn how to safely use, store and throw away your medicines at www.disposemymeds.org.          This list is accurate as of: 8/7/17 10:53 AM.  Always use your most recent med list.                   Brand Name Dispense Instructions for use Diagnosis    albuterol 108 (90 BASE) MCG/ACT Inhaler    PROAIR HFA/PROVENTIL HFA/VENTOLIN HFA    1 Inhaler    Inhale 2 puffs into the lungs every 6 hours as needed for shortness of breath / dyspnea or wheezing Needs to be seen before next refill    Cough       cyanocolbalamin 500 MCG tablet    vitamin  B-12     Take 500 mcg by mouth daily.        cyclobenzaprine 10 MG tablet    FLEXERIL    30 tablet    Take 1 tablet (10 mg) by mouth nightly as needed for muscle spasms    Chronic low back pain without sciatica, unspecified back pain laterality       gabapentin 300 MG capsule    NEURONTIN    360 capsule    One tab at bedtime x2 days, then one tab twice daily x2 days, then 2 tabs at bedtime and one tab in am.    Alcohol-induced polyneuropathy (H)       lisinopril-hydrochlorothiazide 20-12.5 MG per tablet    PRINZIDE/ZESTORETIC    90 tablet    Take 1 tablet by mouth daily    Benign essential hypertension        naproxen 500 MG tablet    NAPROSYN    30 tablet    Take 1 tablet (500 mg) by mouth 2 times daily as needed for moderate pain    Hip pain, right, Acute pain of right knee       oxyCODONE-acetaminophen 5-325 MG per tablet    PERCOCET    14 tablet    Take 1 tablet by mouth nightly as needed for moderate to severe pain or pain (avoid with alcohol)    Hip pain, right       pantoprazole 40 MG EC tablet    PROTONIX    30 tablet    Take 1 tablet (40 mg) by mouth daily Take 30-60 minutes before a meal.    Gastroesophageal reflux disease, esophagitis presence not specified       PREDNISONE PO      Take 20 mg by mouth daily X 5 days        sertraline 50 MG tablet    ZOLOFT    90 tablet    Take 1 tablet (50 mg) by mouth daily    Adjustment disorder with anxious mood       THIAMINE HCL PO      Take 50 mg by mouth daily        traZODone 50 MG tablet    DESYREL    30 tablet    Take 1 tablet (50 mg) by mouth nightly as needed for sleep    Insomnia, unspecified type       VITAMIN D PO      Take 1 tablet by mouth.

## 2017-08-07 NOTE — NURSING NOTE
Chief Complaint   Patient presents with     Hip Pain     Right hip pain. DOI 7/24/17. Patient states he was on a 3-4 ft ladder and his hip gave away and he fell, landing on his right side. He had some pain in the groin. If he sits too long the posterior thigh will go to sleep and his knee and hip start hurting. If he gets up and moves around it helps loosen up. He uses Percocet to help with sleep. He also uses ibuprofen.        Initial /88  Pulse 69  Ht 1.829 m (6')  Wt (!) 140.6 kg (310 lb)  SpO2 98%  BMI 42.04 kg/m2 Estimated body mass index is 42.04 kg/(m^2) as calculated from the following:    Height as of this encounter: 1.829 m (6').    Weight as of this encounter: 140.6 kg (310 lb).  Medication Reconciliation: feliz Gibbs Certified Medical Assistant

## 2017-08-07 NOTE — LETTER
8/7/2017         RE: Chas Scherer  [address] [facility] [address]        Dear Colleague,    Thank you for referring your patient, Chas Scherer, to the [facility] [place]. Please see a copy of my visit note below.    Chief Complaint:   Chief Complaint   Patient presents with     Hip Pain     Right hip pain. DOI 7/24/17. Patient states he was on a 3-4 ft ladder and his hip gave away and he fell, landing on his right side. He had some pain in the groin. If he sits too long the posterior thigh will go to sleep and his knee and hip start hurting. If he gets up and moves around it helps loosen up. He uses Percocet to help with sleep. He also uses ibuprofen.      Chas Scherer is seen today in the [facility] for evaluation of right hip and knee pain at the request of Dr. Pako Jenkins MD      HISTORY OF PRESENT ILLNESS    Chas Scherer is a 51 year old male seen for evaluation of ongoing right hip pain with a known injury.   Pain has been present since 7/24/2017. He was on a 3-4 foot ladder when his hip gave away, causing him to fall and land on his right side. He went to [place] emergency room, with xrays obtained of the pelvis/hip and right knee. He has previously had knee problems in the last. Pain is located in the groin area. His pain is moderate today, rated a 5/10. Pain is aggravated with prolonged sitting. With movement, he will be fine. Night pains.He will have some numbness. He puts a pillow between his leg to sleep. For treatment, he has tried anti-inflammatories and Percocet. 3-5 ibuprofens daily along with his percocet. Knee pain is anterior, medial. Seems to be most painful when the hip is painful. Knee pain improving, almost resolved. He occasionally used a cane in the right hand.    He has had history of L4--L5 problems. History of gastric bypass surgery in 2001.     Present symptoms: moderate pain, groin area, medial knee.    Pain  severity: 5/10  Pain quality: aching  Frequency of symptoms: frequently  Exacerbating Factors: with prolonged sitting  Relieving Factors: with walking  Night Pain: Yes  Pain while at rest: Yes   Associated numbness or tingling: No     Orthopedic PMH: none    Other PMH:  has a past medical history of Acute gouty arthritis (7/10/2017); Alcohol withdrawal syndrome (H) (5/23/2016); Chest pain, unspecified type (6/24/2016); Labral tear of shoulder (12/13/2011); and Shoulder impingement syndrome (10/8/2009).  Patient Active Problem List    Diagnosis Date Noted     BMI 40.0-44.9, adult (H) 06/14/2017     Priority: Medium     Gastroesophageal reflux disease, esophagitis presence not specified 06/14/2017     Priority: Medium     Insomnia, unspecified type 06/14/2017     Priority: Medium     Chronic midline low back pain without sciatica 09/23/2016     Priority: Medium     Alcohol-induced polyneuropathy (H) 09/13/2016     Priority: Medium     Pulmonary nodules 06/24/2016     Priority: Medium     Alcohol abuse 06/24/2016     Priority: Medium     Admission 5/2017 for alcohol withdrawal, seizure       Benign essential hypertension 06/14/2016     Priority: Medium     Adjustment disorder with anxious mood 12/17/2015     Priority: Medium     Advanced directives, counseling/discussion 08/18/2015     Priority: Medium     Parent voices understanding and acceptance of this advice and will call back if any further questions or concerns.phamphlet given          Heel spur 07/01/2014     Priority: Medium     OK for refills of his pain medication through May is in school and working no time off  Until after school -Elvira 1/30/15       Left ankle pain 04/14/2014     Priority: Medium     Generalized anxiety disorder 04/24/2012     Priority: Medium     Tobacco abuse 04/12/2011     Priority: Medium     CARDIOVASCULAR SCREENING; LDL GOAL LESS THAN 130 10/31/2010     Priority: Medium     Gastric bypass status for obesity 10/08/2009     Priority:  [facility] 05/16/2012     Priority: Low     EMERGENCY CARE PLAN  June 5, 2013: No current Care Coordination follow up planned. Please refer if Care Coordination services are needed.    Presenting Problem Signs and Symptoms Treatment Plan   Questions or concerns   during clinic hours   I will call my clinic directly:  [address] [address]  [phone].   Questions or concerns outside clinic hours   I will call the 24 hour nurse line at   [phone] or [address] [place] [address].   Need to schedule an appointment   I will call the 24 hour scheduling team at [phone] or my clinic directly at [phone].    Same day treatment     I will call my clinic first, nurse line if after hours, urgent care and express care if needed.   Clinic care coordination services (regular clinic hours)     I will call a clinic care coordinator directly:     Ramon Amaro RN  Mon, Tues, Fri - [phone]  Wed, Thurs - [phone]    Colleen Larsen :    [phone]    Or call my clinic at [phone] and ask to speak with care coordination.   Crisis Services: Behavioral or Mental Health  [facility] 24 Hour Phone Line  [phone]    [facility] 24 Hour Crisis Services  [phone]    [facility] (Behavioral Health Providers) Network [phone]    [facility]   [phone]       Emergency treatment -- Immediately    [facility] 911              Surgical Hx:  has no past surgical history on file.    Medications:   Current Outpatient Prescriptions:      oxyCODONE-acetaminophen (PERCOCET) 5-325 MG per tablet, Take 1 tablet by mouth nightly as needed for moderate to severe pain or pain (avoid with alcohol), Disp: 14 tablet, Rfl: 0     cyclobenzaprine (FLEXERIL) 10 MG tablet, Take 1 tablet (10 mg) by mouth nightly as needed for muscle spasms, Disp: 30 tablet, Rfl: 0     lisinopril-hydrochlorothiazide (PRINZIDE/ZESTORETIC) 20-12.5 MG per tablet, Take 1 tablet by  mouth daily, Disp: 90 tablet, Rfl: 1     pantoprazole (PROTONIX) 40 MG EC tablet, Take 1 tablet (40 mg) by mouth daily Take 30-60 minutes before a meal., Disp: 30 tablet, Rfl: 1     traZODone (DESYREL) 50 MG tablet, Take 1 tablet (50 mg) by mouth nightly as needed for sleep, Disp: 30 tablet, Rfl: 1     sertraline (ZOLOFT) 50 MG tablet, Take 1 tablet (50 mg) by mouth daily, Disp: 90 tablet, Rfl: 1     gabapentin (NEURONTIN) 300 MG capsule, One tab at bedtime x2 days, then one tab twice daily x2 days, then 2 tabs at bedtime and one tab in am. (Patient taking differently: 2 tab 3 times a day), Disp: 360 capsule, Rfl: 1     Cholecalciferol (VITAMIN D PO), Take 1 tablet by mouth., Disp: , Rfl:      cyanocolbalamin (VITAMIN B-12) 500 MCG tablet, Take 500 mcg by mouth daily., Disp: , Rfl:      PREDNISONE PO, Take 20 mg by mouth daily X 5 days, Disp: , Rfl:      THIAMINE HCL PO, Take 50 mg by mouth daily, Disp: , Rfl:      albuterol (PROAIR HFA/PROVENTIL HFA/VENTOLIN HFA) 108 (90 BASE) MCG/ACT Inhaler, Inhale 2 puffs into the lungs every 6 hours as needed for shortness of breath / dyspnea or wheezing Needs to be seen before next refill (Patient not taking: Reported on 8/7/2017), Disp: 1 Inhaler, Rfl: 0    Allergies: No Known Allergies    Social Hx: does not work.  reports that he has been smoking Cigarettes.  He has a 2.50 pack-year smoking history. He quit smokeless tobacco use about 3 years ago. He reports that he does not drink alcohol or use illicit drugs.    Family Hx: family history includes Alcohol/Drug in his brother and father; Arthritis in his mother; CANCER in his brother, brother, and mother; DIABETES in his brother; GASTROINTESTINAL DISEASE in his brother; Obesity in his brother.    REVIEW OF SYSTEMS:  10 point ROS neg other than the symptoms noted above in the HPI and PAST MEDICAL HISTORY. Notables,  CONSTITUTIONAL:NEGATIVE for fever, chills, change in weight  INTEGUMENTARY/SKIN: NEGATIVE for worrisome rashes,  moles or lesions  MUSCULOSKELETAL:See HPI above  NEURO: NEGATIVE for weakness, dizziness or paresthesias    This document serves as a record of the services and decisions personally performed and made by Leonard Elias MD. It was created on his behalf by Kelley Prieto, a trained medical scribe. The creation of this document is based the provider's statements to the medical scribe.    Scribe Kelley Prieto 9:10 AM 8/7/2017       PHYSICAL EXAM:  /88  Pulse 69  Ht 1.829 m (6')  Wt (!) 140.6 kg (310 lb)  SpO2 98%  BMI 42.04 kg/m2   GENERAL APPEARANCE: healthy, alert, no distress  SKIN: no suspicious lesions or rashes  NEURO: Normal strength and tone, mentation intact and speech normal  PSYCH:  mentation appears normal and affect normal  RESPIRATORY: No increased work of breathing.    BILATERAL LOWER EXTREMITIES:  Gait: antalgic favoring right   varus alignment.  No gross deformities or masses.  No Quad atrophy, strength weak  Intact sensation deep peroneal nerve, superficial peroneal nerve, med/lat tibial nerve, sural nerve, saphenous nerve  Intact EHL, EDL, TA, FHL, GS, quadriceps hamstrings and hip flexors  Toes warm and well perfused, brisk capillary refill. Palpable 2+ dp pulses.  Bilateral calf soft and nttp or squeeze.  Edema: trace  Bilateral varicose veins.    Lumbar range of motion: flexion to distal shin with pulling in the right groin area, extension limited, side bend: adequate with with pain leaning to the left   Squat: positive  Seated SLR: negative  Supine SLR: negative     RIGHT HIP EXAM:    Palpation: Tender: anterior hip and groin area, pubis  Strength:  Grossly intact, some weakness.  Special tests:  logrolling negative, Irritability (flexion/adduction/internal rotation) positive   Hip range of motion: flexion 90 degrees with obligatory external rotation with hip flexion, external rotation: 50, internal rotation: lacks about 20 degrees from neutral    LEFT HIP EXAM:   Palpation: Tender:    Nontender to palpation   Strength:  full strength  Special tests:  logrolling negative, Irritability (flexion/adduction/internal rotation) positive  Hip range of motion: hip flexion 90 degrees with obligatory external rotation, external rotation: 40, internal rotation: lacks 5 degrees from neutral      Right KNEE EXAM:    Skin: intact, no ecchymosis or erythema  ROM: 0 extension to 115 flexion with anterior discomfort   Tight hamstrings on straight leg raise.  Effusion: trace  Tender: medial joint line   NTTP lat joint line, anterior or posterior knee  McMurrays: negative    Valgus stress/MCL: stable, and non-painful at both 0 and 30 degrees knee flexion  Varus stress: stable, and non-painful at both 0 and 30 degrees knee flexion  Lachmans: neg, firm endpoint  Posterior Drawer stable  Patellofemoral joint:                Extensor Lag: none              Q Angle: normal              Patellar Mobility: normal              Apprehension: negative              Crepitations: minimal   Grind: positive    Left KNEE EXAM:    Skin: intact, no ecchymosis or erythema, varicose vains  ROM: 0 extension to 115 flexion  Tight hamstrings on straight leg raise.  Effusion: none  Tender: NTTP med/lat joint line, anterior or posterior knee  McMurrays: negative    Valgus stress/MCL: stable, and non-painful at both 0 and 30 degrees knee flexion  Varus stress: stable, and non-painful at both 0 and 30 degrees knee flexion  Lachmans: neg, firm endpoint  Posterior Drawer stable  Patellofemoral joint:                Extensor Lag: none              Q Angle: normal              Patellar Mobility: normal              Apprehension: negative              Crepitations: minimal   Grind: negative      X-RAY: AP pelvis and Lateral views right hip [facility] taken on 7/28/2017 were reviewed in clinic today. On right side, there is subtle lucency of the posterolateral acetabulum/posterior wall most notable on lateral hip view. Moderate  bilateral hip degenerative changes with marginal osteophytes of acetabulum and femoral head and loss of joint space.      Impression: 51 year old male with right hip pain following injury, primary hip osteoarthritis, possible nondisplaced acetabular fracture, sciatica, right knee pain with primary osteoarthritis.    Plan:   * Reviewed imaging with patient. Also, clinical exam findings. Cannot rule out occult fracture of the right hip, acetabulum or pelvis. Otherwise could just be contusion and pain from a fall injury, with aggravation of underlying arthritis. However, based on xrays, there is concern of a nondisplaced acetabulum fracture.  * I do think that more advanced imaging (MRI, CT) would be beneficial to rule out a more significant injury such as a fracture.  * he is improving, and actually seems better when up and moving rather than sitting around.     * I appreciate his cost concerns not having insurance, and it sounds like they are working that out with [facility].  * I recommend activity modification, using cane/crutches to limit weight bearing on that side. Gentle range of motion is encouraged to prevent too much stiffness.  * Medication: naproxen and percocet prescribed today. Further medication will be deferred to primary care physician.    * return to clinic in 2 weeks. Again will discuss further imaging depending on clinical exam and progress from today.    The information in this document, created by a scribe for me, accurately reflects the services I personally performed and the decisions made by me. I have reviewed and approved this document for accuracy.        Leonard Elias M.D., M.S.  Dept. of Orthopaedic Surgery  [facility]          Again, thank you for allowing me to participate in the care of your patient.        Sincerely,        Leonard Elias MD

## 2017-08-07 NOTE — PROGRESS NOTES
Chief Complaint:   Chief Complaint   Patient presents with     Hip Pain     Right hip pain. DOI 7/24/17. Patient states he was on a 3-4 ft ladder and his hip gave away and he fell, landing on his right side. He had some pain in the groin. If he sits too long the posterior thigh will go to sleep and his knee and hip start hurting. If he gets up and moves around it helps loosen up. He uses Percocet to help with sleep. He also uses ibuprofen.      Chas Scherer is seen today in the [facility] for evaluation of right hip and knee pain at the request of Dr. Pako Jenkins MD      HISTORY OF PRESENT ILLNESS    Chas Scherer is a 51 year old male seen for evaluation of ongoing right hip pain with a known injury.   Pain has been present since 7/24/2017. He was on a 3-4 foot ladder when his hip gave away, causing him to fall and land on his right side. He went to [facility] emergency room, with xrays obtained of the pelvis/hip and right knee. He has previously had knee problems in the last. Pain is located in the groin area. His pain is moderate today, rated a 5/10. Pain is aggravated with prolonged sitting. With movement, he will be fine. Night pains.He will have some numbness. He puts a pillow between his leg to sleep. For treatment, he has tried anti-inflammatories and Percocet. 3-5 ibuprofens daily along with his percocet. Knee pain is anterior, medial. Seems to be most painful when the hip is painful. Knee pain improving, almost resolved. He occasionally used a cane in the right hand.    He has had history of L4--L5 problems. History of gastric bypass surgery in 2001.     Present symptoms: moderate pain, groin area, medial knee.    Pain severity: 5/10  Pain quality: aching  Frequency of symptoms: frequently  Exacerbating Factors: with prolonged sitting  Relieving Factors: with walking  Night Pain: Yes  Pain while at rest: Yes   Associated numbness or tingling: No     Orthopedic PMH: none    Other  PMH:  has a past medical history of Acute gouty arthritis (7/10/2017); Alcohol withdrawal syndrome (H) (5/23/2016); Chest pain, unspecified type (6/24/2016); Labral tear of shoulder (12/13/2011); and Shoulder impingement syndrome (10/8/2009).  Patient Active Problem List    Diagnosis Date Noted     BMI 40.0-44.9, adult (H) 06/14/2017     Priority: Medium     Gastroesophageal reflux disease, esophagitis presence not specified 06/14/2017     Priority: Medium     Insomnia, unspecified type 06/14/2017     Priority: Medium     Chronic midline low back pain without sciatica 09/23/2016     Priority: Medium     Alcohol-induced polyneuropathy (H) 09/13/2016     Priority: Medium     Pulmonary nodules 06/24/2016     Priority: Medium     Alcohol abuse 06/24/2016     Priority: Medium     Admission 5/2017 for alcohol withdrawal, seizure       Benign essential hypertension 06/14/2016     Priority: Medium     Adjustment disorder with anxious mood 12/17/2015     Priority: Medium     Advanced directives, counseling/discussion 08/18/2015     Priority: Medium     Parent voices understanding and acceptance of this advice and will call back if any further questions or concerns.phamphlet given          Heel spur 07/01/2014     Priority: Medium     OK for refills of his pain medication through May is in school and working no time off  Until after school -Elvira 1/30/15       Left ankle pain 04/14/2014     Priority: Medium     Generalized anxiety disorder 04/24/2012     Priority: Medium     Tobacco abuse 04/12/2011     Priority: Medium     CARDIOVASCULAR SCREENING; LDL GOAL LESS THAN 130 10/31/2010     Priority: Medium     Gastric bypass status for obesity 10/08/2009     Priority: Medium     [facility] 05/16/2012     Priority: Low     EMERGENCY CARE PLAN  June 5, 2013: No current Care Coordination follow up planned. Please refer if Care Coordination services are needed.    Presenting Problem Signs and Symptoms Treatment Plan    Questions or concerns   during clinic hours   I will call my clinic directly:  [address] [address]  [phone].   Questions or concerns outside clinic hours   I will call the 24 hour nurse line at   [phone] or [facility].   Need to schedule an appointment   I will call the 24 hour scheduling team at [phone] or my clinic directly at [phone].    Same day treatment     I will call my clinic first, nurse line if after hours, urgent care and express care if needed.   Clinic care coordination services (regular clinic hours)     I will call a clinic care coordinator directly:     Ramon Amaro RN  Mon, Tues, Fri - [phone]  Wed, Thurs - [phone]    Colleen Larsen, SW:    [phone]    Or call my clinic at [phone] and ask to speak with care coordination.   Crisis Services: Behavioral or Mental Health  [facility] 24 Hour Phone Line  [phone]    [facility] 24 Hour Crisis Services  [phone]    [facility] (Behavioral Health Providers) Network [phone]    [facility]   [phone]       Emergency treatment -- Immediately    [facility]All 911              Surgical Hx:  has no past surgical history on file.    Medications:   Current Outpatient Prescriptions:      oxyCODONE-acetaminophen (PERCOCET) 5-325 MG per tablet, Take 1 tablet by mouth nightly as needed for moderate to severe pain or pain (avoid with alcohol), Disp: 14 tablet, Rfl: 0     cyclobenzaprine (FLEXERIL) 10 MG tablet, Take 1 tablet (10 mg) by mouth nightly as needed for muscle spasms, Disp: 30 tablet, Rfl: 0     lisinopril-hydrochlorothiazide (PRINZIDE/ZESTORETIC) 20-12.5 MG per tablet, Take 1 tablet by mouth daily, Disp: 90 tablet, Rfl: 1     pantoprazole (PROTONIX) 40 MG EC tablet, Take 1 tablet (40 mg) by mouth daily Take 30-60 minutes before a meal., Disp: 30 tablet, Rfl: 1     traZODone (DESYREL) 50 MG tablet, Take 1 tablet (50 mg) by mouth nightly as  needed for sleep, Disp: 30 tablet, Rfl: 1     sertraline (ZOLOFT) 50 MG tablet, Take 1 tablet (50 mg) by mouth daily, Disp: 90 tablet, Rfl: 1     gabapentin (NEURONTIN) 300 MG capsule, One tab at bedtime x2 days, then one tab twice daily x2 days, then 2 tabs at bedtime and one tab in am. (Patient taking differently: 2 tab 3 times a day), Disp: 360 capsule, Rfl: 1     Cholecalciferol (VITAMIN D PO), Take 1 tablet by mouth., Disp: , Rfl:      cyanocolbalamin (VITAMIN B-12) 500 MCG tablet, Take 500 mcg by mouth daily., Disp: , Rfl:      PREDNISONE PO, Take 20 mg by mouth daily X 5 days, Disp: , Rfl:      THIAMINE HCL PO, Take 50 mg by mouth daily, Disp: , Rfl:      albuterol (PROAIR HFA/PROVENTIL HFA/VENTOLIN HFA) 108 (90 BASE) MCG/ACT Inhaler, Inhale 2 puffs into the lungs every 6 hours as needed for shortness of breath / dyspnea or wheezing Needs to be seen before next refill (Patient not taking: Reported on 8/7/2017), Disp: 1 Inhaler, Rfl: 0    Allergies: No Known Allergies    Social Hx: does not work.  reports that he has been smoking Cigarettes.  He has a 2.50 pack-year smoking history. He quit smokeless tobacco use about 3 years ago. He reports that he does not drink alcohol or use illicit drugs.    Family Hx: family history includes Alcohol/Drug in his brother and father; Arthritis in his mother; CANCER in his brother, brother, and mother; DIABETES in his brother; GASTROINTESTINAL DISEASE in his brother; Obesity in his brother.    REVIEW OF SYSTEMS:  10 point ROS neg other than the symptoms noted above in the HPI and PAST MEDICAL HISTORY. Notables,  CONSTITUTIONAL:NEGATIVE for fever, chills, change in weight  INTEGUMENTARY/SKIN: NEGATIVE for worrisome rashes, moles or lesions  MUSCULOSKELETAL:See HPI above  NEURO: NEGATIVE for weakness, dizziness or paresthesias    This document serves as a record of the services and decisions personally performed and made by Leonard Elias MD. It was created on his behalf by  Kelley Prieto, a trained medical scribe. The creation of this document is based the provider's statements to the medical scribe.    Scribjessica Prieto 9:10 AM 8/7/2017       PHYSICAL EXAM:  /88  Pulse 69  Ht 1.829 m (6')  Wt (!) 140.6 kg (310 lb)  SpO2 98%  BMI 42.04 kg/m2   GENERAL APPEARANCE: healthy, alert, no distress  SKIN: no suspicious lesions or rashes  NEURO: Normal strength and tone, mentation intact and speech normal  PSYCH:  mentation appears normal and affect normal  RESPIRATORY: No increased work of breathing.    BILATERAL LOWER EXTREMITIES:  Gait: antalgic favoring right   varus alignment.  No gross deformities or masses.  No Quad atrophy, strength weak  Intact sensation deep peroneal nerve, superficial peroneal nerve, med/lat tibial nerve, sural nerve, saphenous nerve  Intact EHL, EDL, TA, FHL, GS, quadriceps hamstrings and hip flexors  Toes warm and well perfused, brisk capillary refill. Palpable 2+ dp pulses.  Bilateral calf soft and nttp or squeeze.  Edema: trace  Bilateral varicose veins.    Lumbar range of motion: flexion to distal shin with pulling in the right groin area, extension limited, side bend: adequate with with pain leaning to the left   Squat: positive  Seated SLR: negative  Supine SLR: negative     RIGHT HIP EXAM:    Palpation: Tender: anterior hip and groin area, pubis  Strength:  Grossly intact, some weakness.  Special tests:  logrolling negative, Irritability (flexion/adduction/internal rotation) positive   Hip range of motion: flexion 90 degrees with obligatory external rotation with hip flexion, external rotation: 50, internal rotation: lacks about 20 degrees from neutral    LEFT HIP EXAM:   Palpation: Tender:   Nontender to palpation   Strength:  full strength  Special tests:  logrolling negative, Irritability (flexion/adduction/internal rotation) positive  Hip range of motion: hip flexion 90 degrees with obligatory external rotation, external rotation: 40, internal  rotation: lacks 5 degrees from neutral      Right KNEE EXAM:    Skin: intact, no ecchymosis or erythema  ROM: 0 extension to 115 flexion with anterior discomfort   Tight hamstrings on straight leg raise.  Effusion: trace  Tender: medial joint line   NTTP lat joint line, anterior or posterior knee  McMurrays: negative    Valgus stress/MCL: stable, and non-painful at both 0 and 30 degrees knee flexion  Varus stress: stable, and non-painful at both 0 and 30 degrees knee flexion  Lachmans: neg, firm endpoint  Posterior Drawer stable  Patellofemoral joint:                Extensor Lag: none              Q Angle: normal              Patellar Mobility: normal              Apprehension: negative              Crepitations: minimal   Grind: positive    Left KNEE EXAM:    Skin: intact, no ecchymosis or erythema, varicose vains  ROM: 0 extension to 115 flexion  Tight hamstrings on straight leg raise.  Effusion: none  Tender: NTTP med/lat joint line, anterior or posterior knee  McMurrays: negative    Valgus stress/MCL: stable, and non-painful at both 0 and 30 degrees knee flexion  Varus stress: stable, and non-painful at both 0 and 30 degrees knee flexion  Lachmans: neg, firm endpoint  Posterior Drawer stable  Patellofemoral joint:                Extensor Lag: none              Q Angle: normal              Patellar Mobility: normal              Apprehension: negative              Crepitations: minimal   Grind: negative      X-RAY: AP pelvis and Lateral views right hip from [facility] taken on 7/28/2017 were reviewed in clinic today. On right side, there is subtle lucency of the posterolateral acetabulum/posterior wall most notable on lateral hip view. Moderate bilateral hip degenerative changes with marginal osteophytes of acetabulum and femoral head and loss of joint space.      Impression: 51 year old male with right hip pain following injury, primary hip osteoarthritis, possible nondisplaced acetabular fracture,  sciatica, right knee pain with primary osteoarthritis.    Plan:   * Reviewed imaging with patient. Also, clinical exam findings. Cannot rule out occult fracture of the right hip, acetabulum or pelvis. Otherwise could just be contusion and pain from a fall injury, with aggravation of underlying arthritis. However, based on xrays, there is concern of a nondisplaced acetabulum fracture.  * I do think that more advanced imaging (MRI, CT) would be beneficial to rule out a more significant injury such as a fracture.  * he is improving, and actually seems better when up and moving rather than sitting around.     * I appreciate his cost concerns not having insurance, and it sounds like they are working that out with [facility].  * I recommend activity modification, using cane/crutches to limit weight bearing on that side. Gentle range of motion is encouraged to prevent too much stiffness.  * Medication: naproxen and percocet prescribed today. Further medication will be deferred to primary care physician.    * return to clinic in 2 weeks. Again will discuss further imaging depending on clinical exam and progress from today.    The information in this document, created by a scribe for me, accurately reflects the services I personally performed and the decisions made by me. I have reviewed and approved this document for accuracy.        Leonard Elias M.D., M.S.  Dept. of Orthopaedic Surgery  [facility]

## 2017-09-01 ENCOUNTER — MYC REFILL (OUTPATIENT)
Dept: FAMILY MEDICINE | Facility: CLINIC | Age: 52
End: 2017-09-01

## 2017-09-01 ENCOUNTER — MYC REFILL (OUTPATIENT)
Dept: ORTHOPEDICS | Facility: CLINIC | Age: 52
End: 2017-09-01

## 2017-09-01 DIAGNOSIS — K21.9 GASTROESOPHAGEAL REFLUX DISEASE, ESOPHAGITIS PRESENCE NOT SPECIFIED: ICD-10-CM

## 2017-09-01 DIAGNOSIS — G47.00 INSOMNIA, UNSPECIFIED TYPE: ICD-10-CM

## 2017-09-01 DIAGNOSIS — M25.551 HIP PAIN, RIGHT: ICD-10-CM

## 2017-09-01 RX ORDER — PANTOPRAZOLE SODIUM 40 MG/1
40 TABLET, DELAYED RELEASE ORAL DAILY
Qty: 30 TABLET | Refills: 0 | Status: SHIPPED | OUTPATIENT
Start: 2017-09-01 | End: 2017-09-18

## 2017-09-01 RX ORDER — TRAZODONE HYDROCHLORIDE 50 MG/1
50 TABLET, FILM COATED ORAL
Qty: 30 TABLET | Refills: 0 | Status: SHIPPED | OUTPATIENT
Start: 2017-09-01 | End: 2017-10-19

## 2017-09-01 RX ORDER — OXYCODONE AND ACETAMINOPHEN 5; 325 MG/1; MG/1
1 TABLET ORAL
Qty: 14 TABLET | Refills: 0 | Status: CANCELLED | OUTPATIENT
Start: 2017-09-01

## 2017-09-01 NOTE — TELEPHONE ENCOUNTER
Message from [employer]:  Original authorizing provider: Leonard Elias MD    Chas WHEATCristian Jeffалександр would like a refill of the following medications:  oxyCODONE-acetaminophen (PERCOCET) 5-325 MG per tablet [Leonard Elias MD]    Preferred pharmacy: [facility] [place] [address], [address]    Comment:  I'm making an appoint ment for next week if I can. Following up on crack in hip. Also to speak with you about mussel spasms an my feet.    Medication renewals requested in this message routed to other providers:  pantoprazole (PROTONIX) 40 MG EC tablet [Ke Hope PA-C]  traZODone (DESYREL) 50 MG tablet [Ke Hope PA-C]

## 2017-09-01 NOTE — TELEPHONE ENCOUNTER
Message from [employer]:  Original authorizing provider: NAUN Muñiz JARRETCristian Scherer would like a refill of the following medications:  pantoprazole (PROTONIX) 40 MG EC tablet [Ke Hope PA-C]  traZODone (DESYREL) 50 MG tablet [Ke Hope PA-C]    Preferred pharmacy: [place] [address], [address]    Comment:  I'm making an appoint ment for next week if I can. Following up on crack in hip. Also to speak with you about mussel spasms an my feet.    Medication renewals requested in this message routed to other providers:  oxyCODONE-acetaminophen (PERCOCET) 5-325 MG per tablet [Leonard Elias MD]

## 2017-09-01 NOTE — TELEPHONE ENCOUNTER
Routing refill request to provider for review/approval because:  Drug not on the [facility] refill protocol     Oxycodone-acetaminopehn (Percocet)      Last Written Prescription Date: 8/7/2017  Last Fill Quantity: 14,  # refills: 0   Last Office Visit with [facility], P or [facility] prescribing provider: 8/7/2017    Lupe Cristobal RN

## 2017-09-06 NOTE — TELEPHONE ENCOUNTER
Called and left a vm for the patient letting him know the pain medication request was denied by Dr. Elias but he could call his primary care physician to see if they would be willing to give him some. I left our call back number.  Charline Gibbs Certified Medical Assistant

## 2017-09-18 ENCOUNTER — TELEPHONE (OUTPATIENT)
Dept: FAMILY MEDICINE | Facility: CLINIC | Age: 52
End: 2017-09-18

## 2017-09-18 ENCOUNTER — OFFICE VISIT (OUTPATIENT)
Dept: FAMILY MEDICINE | Facility: CLINIC | Age: 52
End: 2017-09-18

## 2017-09-18 VITALS
HEART RATE: 76 BPM | DIASTOLIC BLOOD PRESSURE: 84 MMHG | WEIGHT: 302 LBS | SYSTOLIC BLOOD PRESSURE: 123 MMHG | HEIGHT: 72 IN | TEMPERATURE: 97.3 F | BODY MASS INDEX: 40.9 KG/M2 | OXYGEN SATURATION: 99 %

## 2017-09-18 DIAGNOSIS — R25.2 CRAMP OF LIMB: Primary | ICD-10-CM

## 2017-09-18 DIAGNOSIS — M54.50 CHRONIC LOW BACK PAIN WITHOUT SCIATICA, UNSPECIFIED BACK PAIN LATERALITY: ICD-10-CM

## 2017-09-18 DIAGNOSIS — G62.1 ALCOHOL-INDUCED POLYNEUROPATHY (H): ICD-10-CM

## 2017-09-18 DIAGNOSIS — I10 BENIGN ESSENTIAL HYPERTENSION: ICD-10-CM

## 2017-09-18 DIAGNOSIS — M25.551 HIP PAIN, RIGHT: ICD-10-CM

## 2017-09-18 DIAGNOSIS — F43.22 ADJUSTMENT DISORDER WITH ANXIOUS MOOD: ICD-10-CM

## 2017-09-18 DIAGNOSIS — G89.29 CHRONIC LOW BACK PAIN WITHOUT SCIATICA, UNSPECIFIED BACK PAIN LATERALITY: ICD-10-CM

## 2017-09-18 PROCEDURE — 99214 OFFICE O/P EST MOD 30 MIN: CPT | Performed by: FAMILY MEDICINE

## 2017-09-18 RX ORDER — OXYCODONE AND ACETAMINOPHEN 5; 325 MG/1; MG/1
1 TABLET ORAL
Qty: 30 TABLET | Refills: 0 | Status: SHIPPED | OUTPATIENT
Start: 2017-09-18 | End: 2017-10-19

## 2017-09-18 RX ORDER — LISINOPRIL AND HYDROCHLOROTHIAZIDE 12.5; 2 MG/1; MG/1
1 TABLET ORAL DAILY
Qty: 90 TABLET | Refills: 1 | Status: CANCELLED | OUTPATIENT
Start: 2017-09-18

## 2017-09-18 RX ORDER — CYCLOBENZAPRINE HCL 10 MG
10 TABLET ORAL
Qty: 90 TABLET | Refills: 1 | Status: SHIPPED | OUTPATIENT
Start: 2017-09-18 | End: 2017-11-29

## 2017-09-18 NOTE — MR AVS SNAPSHOT
After Visit Summary   9/18/2017    Chas Scherer    MRN: [id number]           Patient Information     Date Of Birth          1965        Visit Information        Provider Department      9/18/2017 7:15 AM Pako Jenkins MD [facility]        Today's Diagnoses     Cramp of limb    -  1    Alcohol-induced polyneuropathy (H)        Adjustment disorder with anxious mood        Chronic low back pain without sciatica, unspecified back pain laterality        Hip pain, right           Follow-ups after your visit        Additional Services     NEUROLOGY ADULT REFERRAL       Your provider has referred you for the following:   Consult at [facility]: [facility] - Allie Kaba [phone]   [email or web address]  [facility]: [facility] [phone]   [email or web address]    Please be aware that coverage of these services is subject to the terms and limitations of your health insurance plan.  Call member services at your health plan with any benefit or coverage questions.      Please bring the following with you to your appointment:    (1) Any X-Rays, CTs or MRIs which have been performed.  Contact the facility where they were done to arrange for  prior to your scheduled appointment.    (2) List of current medications  (3) This referral request   (4) Any documents/labs given to you for this referral                  Who to contact     If you have questions or need follow up information about today's clinic visit or your schedule please contact [facility] directly at [phone].  Normal or non-critical lab and imaging results will be communicated to you by MyChart, letter or phone within 4 business days after the clinic has received the results. If you do not hear from us within 7 days, please contact the clinic through MyChart or phone. If you have a critical or abnormal lab result, we will notify  you by phone as soon as possible.  Submit refill requests through [facility] or call your pharmacy and they will forward the refill request to us. Please allow 3 business days for your refill to be completed.          Additional Information About Your Visit        [facility] Information     [facility] gives you secure access to your electronic health record. If you see a primary care provider, you can also send messages to your care team and make appointments. If you have questions, please call your primary care clinic.  If you do not have a primary care provider, please call [phone] and they will assist you.        Care EveryWhere ID     This is your Care EveryWhere ID. This could be used by other organizations to access your [facility] medical records  [id number]        Your Vitals Were     Pulse Temperature Height Pulse Oximetry BMI (Body Mass Index)       76 97.3  F (36.3  C) (Oral) 6' (1.829 m) 99% 40.96 kg/m2        Blood Pressure from Last 3 Encounters:   09/18/17 123/84   08/07/17 132/88   07/31/17 127/86    Weight from Last 3 Encounters:   09/18/17 (!) 302 lb (137 kg)   08/07/17 (!) 310 lb (140.6 kg)   07/31/17 (!) 311 lb (141.1 kg)              We Performed the Following     NEUROLOGY ADULT REFERRAL          Where to get your medicines      These medications were sent to [facility] [id number] [place] [address], [address]  [id number] [place] [address], [address], [place] [id number]     Phone:  [phone]     cyclobenzaprine 10 MG tablet         Some of these will need a paper prescription and others can be bought over the counter.  Ask your nurse if you have questions.     Bring a paper prescription for each of these medications     oxyCODONE-acetaminophen 5-325 MG per tablet          Primary Care Provider Office Phone # Fax #    Pako Jenkins [phone] [address] [place] [address]        Equal Access to Services     JOSEFINA LOYD AH: Celine Huber,  wadeidreda luhuadaha, qaybta kaalta shipley, carrie gibsonaaалександр ah. [facility] [phone].    ATENCIÓN: Si lilian gorman, tiene a valadez disposición servicios gratuitos de asistencia lingüística. Luz al [phone].    We comply with applicable federal civil rights laws and [place] laws. We do not discriminate on the basis of race, color, national origin, age, disability sex, sexual orientation or gender identity.            Thank you!     Thank you for [facility]  [facility]. Our goal is always to provide you with excellent care. Hearing back from our patients is one way we can continue to improve our services. Please take a few minutes to complete the written survey that you may receive in the mail after your visit with us. Thank you!             Your Updated Medication List - Protect others around you: Learn how to safely use, store and throw away your medicines at www.disposemymeds.org.          This list is accurate as of: 9/18/17  7:38 AM.  Always use your most recent med list.                   Brand Name Dispense Instructions for use Diagnosis    ADVIL PO      Take 200 mg by mouth As needed        albuterol 108 (90 BASE) MCG/ACT Inhaler    PROAIR HFA/PROVENTIL HFA/VENTOLIN HFA    1 Inhaler    Inhale 2 puffs into the lungs every 6 hours as needed for shortness of breath / dyspnea or wheezing Needs to be seen before next refill    Cough       cyanocolbalamin 500 MCG tablet    vitamin  B-12     Take 500 mcg by mouth daily.        cyclobenzaprine 10 MG tablet    FLEXERIL    90 tablet    Take 1 tablet (10 mg) by mouth nightly as needed for muscle spasms    Chronic low back pain without sciatica, unspecified back pain laterality       gabapentin 300 MG capsule    NEURONTIN    180 capsule    2 tab 3 times a day    Alcohol-induced polyneuropathy (H)       lisinopril-hydrochlorothiazide 20-12.5 MG per tablet    PRINZIDE/ZESTORETIC    90 tablet    Take 1 tablet by mouth  daily    Benign essential hypertension       oxyCODONE-acetaminophen 5-325 MG per tablet    PERCOCET    30 tablet    Take 1 tablet by mouth nightly as needed for moderate to severe pain or pain (avoid with alcohol)    Hip pain, right       sertraline 50 MG tablet    ZOLOFT    90 tablet    Take 1 tablet (50 mg) by mouth daily    Adjustment disorder with anxious mood       THIAMINE HCL PO      Take 50 mg by mouth daily        traZODone 50 MG tablet    DESYREL    30 tablet    Take 1 tablet (50 mg) by mouth nightly as needed for sleep    Insomnia, unspecified type       VITAMIN D PO      Take 1 tablet by mouth.

## 2017-09-18 NOTE — NURSING NOTE
Chief Complaint   Patient presents with     Musculoskeletal Problem     right hip       Initial /84 (BP Location: Left arm, Patient Position: Sitting, Cuff Size: Adult Large)  Pulse 76  Temp 97.3  F (36.3  C) (Oral)  Ht 6' (1.829 m)  Wt (!) 302 lb (137 kg)  SpO2 99%  BMI 40.96 kg/m2 Estimated body mass index is 40.96 kg/(m^2) as calculated from the following:    Height as of this encounter: 6' (1.829 m).    Weight as of this encounter: 302 lb (137 kg).  Medication Reconciliation: complete     Katy Hernandez, CMA

## 2017-09-18 NOTE — PROGRESS NOTES
SUBJECTIVE:  Chas Scherer, a 52 year old male scheduled an appointment to discuss the following issues:     Alcohol-induced polyneuropathy (H)  Benign essential hypertension  Adjustment disorder with anxious mood  Chronic low back pain without sciatica, unspecified back pain laterality  Hip pain, right  Follow-up hip pain. Seen by ortho - possible fracture but no MRI done. No insurance. No surgery warranted.  Med refills. Percocet 1-2x/day. Work at [employer].   trazadone helpful prn - not everynight. Ibuprofen prn. No history ulcers. Taking with food. No black/bloody stools and abdominal pain. Water intake good. Some leg crampings especially at bedtime.   Taking vitaminD but limited.   Emotionally ok. No SUICIAL IDEATION OR HOMOCIDAL IDEATION OR ONEYDA.  Gabapentin helpful 2 tab TID for feet tingling.  No chest pain or shortness of breath. Patient not interested in labs currently.   Working on insurance.     Medical, social, surgical, and family histories reviewed.    ROS:  All other ROS negative  OBJECTIVE:  /84 (BP Location: Left arm, Patient Position: Sitting, Cuff Size: Adult Large)  Pulse 76  Temp 97.3  F (36.3  C) (Oral)  Ht 6' (1.829 m)  Wt (!) 302 lb (137 kg)  SpO2 99%  BMI 40.96 kg/m2  EXAM:  GENERAL APPEARANCE: healthy, alert and no distress  EYES: EOMI,  PERRL  NECK: no adenopathy, no asymmetry, masses, or scars and thyroid normal to palpation  RESP: lungs clear to auscultation - no rales, rhonchi or wheezes  CV: regular rates and rhythm, normal S1 S2, no S3 or S4 and no murmur, click or rub -  ABDOMEN:  soft, nontender, no HSM or masses and bowel sounds normal  MS: extremities normal- no gross deformities noted, no evidence of inflammation in joints, FROM in all extremities.  MS: mild pain with RANGE OF MOTION right hip.  PSYCH: mentation appears normal and affect normal/bright    ASSESSMENT / PLAN:  (R25.2) Cramp of limb  (primary encounter diagnosis)  Comment: possible electrolyte  issue  Plan: NEUROLOGY ADULT REFERRAL        Potassium -banana/ calcium - milk and tonic water at bedtime and stretching. Patient deferred labs until next month - working on insurance. Continue flexeril prn - helpful. Can see neurology too.    (G62.1) Alcohol-induced polyneuropathy (H)  Comment: gabapentin helpful  Plan: NEUROLOGY ADULT REFERRAL        Patient interested in seeing neuro with insurance in future. Expected course and warning signs reviewed.     (F43.22) Adjustment disorder with anxious mood  Comment: stable  Plan: continue zoloft. If SUICIAL IDEATION OR HOMOCIDAL IDEATION OR ONEYDA TO ER. Call/email with questions/concerns     (M54.5,  G71.29) Chronic low back pain without sciatica, unspecified back pain laterality  Plan: cyclobenzaprine (FLEXERIL) 10 MG tablet            (M25.551) Hip pain, right  Comment: slowly healing  Plan: oxyCODONE-acetaminophen (PERCOCET) 5-325 MG per        tablet        Continue prn sleep. Limit nsaids and take zantac to protect stomach. Weight loss. Reveiwed risks and side effects of medication  Expected course and warning signs reviewed. Continue avoid ALCOHOL. Back to ortho if worse.    Pako Jenkins MD

## 2017-10-19 ENCOUNTER — MYC MEDICAL ADVICE (OUTPATIENT)
Dept: FAMILY MEDICINE | Facility: CLINIC | Age: 52
End: 2017-10-19

## 2017-11-29 ENCOUNTER — MYC MEDICAL ADVICE (OUTPATIENT)
Dept: FAMILY MEDICINE | Facility: CLINIC | Age: 52
End: 2017-11-29

## 2017-12-27 ENCOUNTER — DOCUMENTATION ONLY (OUTPATIENT)
Dept: LAB | Facility: CLINIC | Age: 52
End: 2017-12-27

## 2017-12-27 DIAGNOSIS — Z13.1 SCREENING FOR DIABETES MELLITUS: ICD-10-CM

## 2017-12-27 DIAGNOSIS — Z13.6 CARDIOVASCULAR SCREENING; LDL GOAL LESS THAN 130: Primary | Chronic | ICD-10-CM

## 2017-12-27 DIAGNOSIS — Z00.00 ROUTINE GENERAL MEDICAL EXAMINATION AT A HEALTH CARE FACILITY: ICD-10-CM

## 2017-12-27 DIAGNOSIS — Z12.5 SPECIAL SCREENING FOR MALIGNANT NEOPLASM OF PROSTATE: ICD-10-CM

## 2017-12-27 DIAGNOSIS — Z12.11 SPECIAL SCREENING FOR MALIGNANT NEOPLASMS, COLON: ICD-10-CM

## 2017-12-27 NOTE — PROGRESS NOTES
This patient has a lab only appointment on 1/2/2018 but does not have future orders. Please review, associate diagnosis and sign pending lab orders for the upcoming appointment. [facility] is indicating this patient is also due for a drug screen. If you would like a drug screen performed please place and order in EPIC.    He has an appointment with Dr. Jenkins on 1/9/2018.    Thank you,    [facility]

## 2017-12-27 NOTE — LETTER
[facility]  [address] [facility] [id number] [phone]        January 2, 2019    Chas Scherer  [address] [facility] [address]              Dear Chas Scherer    This is to remind you that your Fasting lab is due.    You may call our office at [phone] to schedule an appointment.    Please disregard this notice if you have already had your labs drawn or made an appointment.        Sincerely,        Pako Jenkins MD

## 2017-12-28 NOTE — PROGRESS NOTES
Need previsit labs-See pending orders and close encounter./Joelle Montaño,         Physical 1/9/18

## 2018-04-16 DIAGNOSIS — G47.00 INSOMNIA, UNSPECIFIED TYPE: ICD-10-CM

## 2018-04-16 RX ORDER — TRAZODONE HYDROCHLORIDE 50 MG/1
TABLET, FILM COATED ORAL
Qty: 30 TABLET | Refills: 0 | Status: SHIPPED | OUTPATIENT
Start: 2018-04-16 | End: 2018-09-11

## 2018-09-13 ENCOUNTER — MYC REFILL (OUTPATIENT)
Dept: FAMILY MEDICINE | Facility: CLINIC | Age: 53
End: 2018-09-13

## 2018-09-13 DIAGNOSIS — M54.50 CHRONIC LOW BACK PAIN WITHOUT SCIATICA, UNSPECIFIED BACK PAIN LATERALITY: ICD-10-CM

## 2018-09-13 DIAGNOSIS — G47.00 INSOMNIA, UNSPECIFIED TYPE: ICD-10-CM

## 2018-09-13 DIAGNOSIS — G89.29 CHRONIC LOW BACK PAIN WITHOUT SCIATICA, UNSPECIFIED BACK PAIN LATERALITY: ICD-10-CM

## 2018-09-13 RX ORDER — CYCLOBENZAPRINE HCL 10 MG
10 TABLET ORAL
Qty: 30 TABLET | Refills: 0 | Status: CANCELLED | OUTPATIENT
Start: 2018-09-13

## 2018-09-13 RX ORDER — TRAZODONE HYDROCHLORIDE 50 MG/1
50 TABLET, FILM COATED ORAL
Qty: 30 TABLET | Refills: 0 | Status: CANCELLED | OUTPATIENT
Start: 2018-09-13

## 2018-09-13 NOTE — TELEPHONE ENCOUNTER
Message from [place]:  Original authorizing provider: MD Chas Martínez would like a refill of the following medications:  cyclobenzaprine (FLEXERIL) 10 MG tablet [Pako Jenkins MD]  traZODone (DESYREL) 50 MG tablet [Pako Jenkins MD]    Preferred pharmacy: [place] [facility] [address] [address] [facility], [address]    Comment:  Please refill these two prescriptions. if needed I will see the doctor.

## 2018-09-14 ENCOUNTER — TELEPHONE (OUTPATIENT)
Dept: FAMILY MEDICINE | Facility: CLINIC | Age: 53
End: 2018-09-14

## 2018-09-14 NOTE — TELEPHONE ENCOUNTER
Need to be seen and why can't patient drive? Seizure / alcoholism? 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 Pako Jenkins MD

## 2018-09-14 NOTE — TELEPHONE ENCOUNTER
I assume patient will need to see you, it has been almost 1 year since you have seen him.Princess Molina MA/LINDY

## 2018-09-14 NOTE — TELEPHONE ENCOUNTER
He needs a note/letter for the DMV stating its ok for Chas to drive.   Also, would like After visit summary from his physical in February.  Fax both of these to DM at [phone].  Also, Chevy wants to pickup these copies Monday at the .

## 2019-02-05 ENCOUNTER — DOCUMENTATION ONLY (OUTPATIENT)
Dept: LAB | Facility: CLINIC | Age: 54
End: 2019-02-05

## 2019-02-05 NOTE — PROGRESS NOTES
On 19, we sent an over due lab letter to this patient and he has not made an appt. The tests are now  and have been canceled. If you would like him to return to the clinic for a lab only appt, please have your team contact him and place new Future orders.    Thank you, Yamilet Samaniego MLT

## 2019-11-04 ENCOUNTER — HEALTH MAINTENANCE LETTER (OUTPATIENT)
Age: 54
End: 2019-11-04

## 2020-02-07 ENCOUNTER — OFFICE VISIT (OUTPATIENT)
Dept: URGENT CARE | Facility: URGENT CARE | Age: 55
End: 2020-02-07
Payer: COMMERCIAL

## 2020-02-07 VITALS
TEMPERATURE: 98.2 F | OXYGEN SATURATION: 96 % | DIASTOLIC BLOOD PRESSURE: 89 MMHG | SYSTOLIC BLOOD PRESSURE: 153 MMHG | HEART RATE: 69 BPM

## 2020-02-07 DIAGNOSIS — J06.9 UPPER RESPIRATORY TRACT INFECTION, UNSPECIFIED TYPE: Primary | ICD-10-CM

## 2020-02-07 PROCEDURE — 99213 OFFICE O/P EST LOW 20 MIN: CPT | Performed by: PHYSICIAN ASSISTANT

## 2020-02-07 ASSESSMENT — ENCOUNTER SYMPTOMS
EYES NEGATIVE: 1
HEMATOLOGIC/LYMPHATIC NEGATIVE: 1
ALLERGIC/IMMUNOLOGIC NEGATIVE: 1
GASTROINTESTINAL NEGATIVE: 1
RESPIRATORY NEGATIVE: 1
PSYCHIATRIC NEGATIVE: 1
ENDOCRINE NEGATIVE: 1
CARDIOVASCULAR NEGATIVE: 1
CONSTITUTIONAL NEGATIVE: 1

## 2020-02-07 NOTE — LETTER
[facility]  [address] [facility] [address]  Phone: [phone]    February 7, 2020        Chas Scherer  [address] [facility] [address]          To whom it may concern:    RE: Chas Scherer    Patient was seen and treated today at our clinic and missed work on 2/6 and 2/7.    Please contact me for questions or concerns.      Sincerely,        Lorena Swain PA-C

## 2020-02-08 NOTE — PROGRESS NOTES
SUBJECTIVE:   Chas Scherer is a 54 year old male presenting with a chief complaint of   Chief Complaint   Patient presents with     Sinus Problem     sinus congestion 3 days --needing note to return to work       He is a new patient of [facility].  Patient presents with 3 days of URI like symptoms.  Patient sent home from work yesterday due to illness.  Patient states he has had no fevers and feels better today.  He needs a note to return to work.      URI Adult    Onset of symptoms was 3 day(s) ago.  Course of illness is improving.    Severity mild  Current and Associated symptoms: sore throat  Treatment measures tried include None tried.  Predisposing factors include ill contact: Work.        Review of Systems   Constitutional: Negative.    HENT: Negative.    Eyes: Negative.    Respiratory: Negative.    Cardiovascular: Negative.    Gastrointestinal: Negative.    Endocrine: Negative.    Genitourinary: Negative.    Skin: Negative.    Allergic/Immunologic: Negative.    Hematological: Negative.    Psychiatric/Behavioral: Negative.    All other systems reviewed and are negative.      Past Medical History:   Diagnosis Date     Acute gouty arthritis 7/10/2017     Alcohol withdrawal syndrome (H) 5/23/2016     Chest pain, unspecified type 6/24/2016     Labral tear of shoulder 12/13/2011     Shoulder impingement syndrome 10/8/2009     Family History   Problem Relation Age of Onset     Arthritis Mother      Cancer Mother         Lung ca     Alcohol/Drug Father      Diabetes Brother      Alcohol/Drug Brother      Gastrointestinal Disease Brother      Cancer Brother      Cancer Brother         Lung ca     Obesity Brother      Current Outpatient Medications   Medication Sig Dispense Refill     albuterol (PROAIR HFA/PROVENTIL HFA/VENTOLIN HFA) 108 (90 BASE) MCG/ACT Inhaler Inhale 2 puffs into the lungs every 6 hours as needed for shortness of breath / dyspnea or wheezing Needs to be seen before next refill 1 Inhaler 0      Cholecalciferol (VITAMIN D PO) Take 1 tablet by mouth.       cyanocolbalamin (VITAMIN B-12) 500 MCG tablet Take 500 mcg by mouth daily.       cyclobenzaprine (FLEXERIL) 10 MG tablet Take 1 tablet (10 mg) by mouth nightly as needed for muscle spasms Need to be seen for more 30 tablet 0     gabapentin (NEURONTIN) 300 MG capsule 2 tab 3 times a day 180 capsule 0     Ibuprofen (ADVIL PO) Take 200 mg by mouth As needed       sertraline (ZOLOFT) 50 MG tablet Take 1 tablet (50 mg) by mouth daily 90 tablet 1     THIAMINE HCL PO Take 50 mg by mouth daily       traZODone (DESYREL) 50 MG tablet Take 1 tablet (50 mg) by mouth nightly as needed 30 tablet 0     lisinopril-hydrochlorothiazide (PRINZIDE/ZESTORETIC) 20-12.5 MG per tablet Take 1 tablet by mouth daily (Patient not taking: Reported on 2/7/2020) 90 tablet 0     oxyCODONE-acetaminophen (PERCOCET) 5-325 MG per tablet Take 1 tablet by mouth nightly as needed for moderate to severe pain or pain (avoid with alcohol) (Patient not taking: Reported on 2/7/2020) 20 tablet 0     Social History     Tobacco Use     Smoking status: Current Some Day Smoker     Packs/day: 0.50     Years: 5.00     Pack years: 2.50     Types: Cigarettes     Smokeless tobacco: Former User     Quit date: 2/2/2014   Substance Use Topics     Alcohol use: No     Comment: quit 9-2009       OBJECTIVE  BP (!) 153/89   Pulse 69   Temp 98.2  F (36.8  C) (Tympanic)   SpO2 96%     Physical Exam  Vitals signs and nursing note reviewed.   Constitutional:       Appearance: Normal appearance. He is normal weight.   HENT:      Head: Normocephalic and atraumatic.      Right Ear: Tympanic membrane normal.      Left Ear: Tympanic membrane normal.      Mouth/Throat:      Mouth: Mucous membranes are moist.      Pharynx: Oropharynx is clear.   Eyes:      Extraocular Movements: Extraocular movements intact.      Conjunctiva/sclera: Conjunctivae normal.   Neck:      Musculoskeletal: Normal range of motion and neck supple.    Cardiovascular:      Rate and Rhythm: Normal rate and regular rhythm.      Pulses: Normal pulses.      Heart sounds: Normal heart sounds.   Pulmonary:      Effort: Pulmonary effort is normal.      Breath sounds: Normal breath sounds.   Skin:     General: Skin is warm and dry.      Capillary Refill: Capillary refill takes less than 2 seconds.   Neurological:      General: No focal deficit present.      Mental Status: He is alert.   Psychiatric:         Mood and Affect: Mood normal.         Behavior: Behavior normal.         Labs:  No results found for this or any previous visit (from the past 24 hour(s)).    X-Ray was not done.    ASSESSMENT:      ICD-10-CM    1. Upper respiratory tract infection, unspecified type J06.9         Medical Decision Making:    Differential Diagnosis:  URI Adult/Peds:  Viral upper respiratory illness    Serious Comorbid Conditions:  Adult:  None    PLAN:    URI Adult:  Tylenol and Ibuprofen.  Note for work.    Followup:    If not improving or if condition worsens, follow up with your Primary Care Provider    Patient Instructions     Patient Education     Preventing Common Respiratory Infections    Respiratory infections such as colds and influenza ( the flu ) are common in winter. These infections are often caused by viruses. They may share some symptoms, but not all respiratory infections are the same. Some make you more sick than others. You can take steps to prevent common respiratory infections. And if you get sick, you can take care of yourself to keep the infection from getting worse.  What is a cold?    Symptoms include runny nose, coughing and sneezing, and sore throat. Cold symptoms tend to be milder than flu symptoms.    Symptoms tend to come on slowly. They last for a few days to about a week.    With a cold, you can still do most of the things you usually do.  What is the flu?    Symptoms include fever, headache, fatigue, cough, sore throat, runny nose, and muscle aches.  Children may have upset stomach and vomiting, but adults usually don t.    Symptoms tend to come on quickly. Some, such as fatigue and cough, can last a few weeks.    With the flu, you may feel worn out and not able to do normal activities.    It s most likely NOT the flu if an adult has vomiting or diarrhea for a day or two. This so-called  stomach flu  is probably a GI (gastrointestinal) infection.  When the infection gets worse  Without proper care, a respiratory infection can get worse. It can lead to serious complications and death. If you aren t getting better, call your healthcare provider. Complications can include:    Bronchitis (infection of the airways that leads to shortness of breath and coughing up thick yellow or green mucus)    Pneumonia (infection of the lungs in which fluid and mucus settle in the lungs, making breathing difficult)    Worsening of chronic conditions such as heart failure, chronic lung disease, asthma, or diabetes    Severe dehydration (loss of fluids)    Sinus problems    Ear infections  Get a flu vaccine  A flu vaccine protects you from influenza (but not other colds or infections). Get a vaccine each fall, before flu season starts. This can be done at a clinic, healthcare provider s office, drugstore, senior center, or through your workplace.  Get pneumococcal vaccines  Pneumonia can be a complication of influenza. There are 2 pneumococcal pneumonia vaccines that protect against many types of pneumonia. Talk with your healthcare provider about these important vaccines.   Keep germs from spreading  No one likes getting sick. To protect yourself and others from cold and flu germs:    Wash your hands often. Use alcohol-based hand  when you don t have access to soap and water.    Don t touch your eyes, nose, and mouth. This may help you keep germs out of your body.    Try to avoid people with respiratory infections. You may want to stay out of crowds during flu season  (winter).    Ask your healthcare provider if you should get a pneumonia vaccination.  How to wash your hands    Use warm water and plenty of soap. Work up a good lather.    Clean your whole hand, under your nails, between your fingers, and up your wrists. Wash for at least 15 to 20 seconds. Don t just wipe--rub well.    Rinse. Let the water run down your fingertips, not up your wrists.    In a public restroom, use a paper towel to turn off the faucet and open the door.  Date Last Reviewed: 12/1/2016 2000-2019 [employer]. [address] [place] [address], [place] [address]. All rights reserved. This information is not intended as a substitute for professional medical care. Always follow your healthcare professional's instructions.

## 2020-02-08 NOTE — PATIENT INSTRUCTIONS
Patient Education     Preventing Common Respiratory Infections    Respiratory infections such as colds and influenza ( the flu ) are common in winter. These infections are often caused by viruses. They may share some symptoms, but not all respiratory infections are the same. Some make you more sick than others. You can take steps to prevent common respiratory infections. And if you get sick, you can take care of yourself to keep the infection from getting worse.  What is a cold?    Symptoms include runny nose, coughing and sneezing, and sore throat. Cold symptoms tend to be milder than flu symptoms.    Symptoms tend to come on slowly. They last for a few days to about a week.    With a cold, you can still do most of the things you usually do.  What is the flu?    Symptoms include fever, headache, fatigue, cough, sore throat, runny nose, and muscle aches. Children may have upset stomach and vomiting, but adults usually don t.    Symptoms tend to come on quickly. Some, such as fatigue and cough, can last a few weeks.    With the flu, you may feel worn out and not able to do normal activities.    It s most likely NOT the flu if an adult has vomiting or diarrhea for a day or two. This so-called  stomach flu  is probably a GI (gastrointestinal) infection.  When the infection gets worse  Without proper care, a respiratory infection can get worse. It can lead to serious complications and death. If you aren t getting better, call your healthcare provider. Complications can include:    Bronchitis (infection of the airways that leads to shortness of breath and coughing up thick yellow or green mucus)    Pneumonia (infection of the lungs in which fluid and mucus settle in the lungs, making breathing difficult)    Worsening of chronic conditions such as heart failure, chronic lung disease, asthma, or diabetes    Severe dehydration (loss of fluids)    Sinus problems    Ear infections  Get a flu vaccine  A flu vaccine protects  you from influenza (but not other colds or infections). Get a vaccine each fall, before flu season starts. This can be done at a clinic, healthcare provider s office, drugstore, [facility] center, or through your workplace.  Get pneumococcal vaccines  Pneumonia can be a complication of influenza. There are 2 pneumococcal pneumonia vaccines that protect against many types of pneumonia. Talk with your healthcare provider about these important vaccines.   Keep germs from spreading  No one likes getting sick. To protect yourself and others from cold and flu germs:    Wash your hands often. Use alcohol-based hand  when you don t have access to soap and water.    Don t touch your eyes, nose, and mouth. This may help you keep germs out of your body.    Try to avoid people with respiratory infections. You may want to stay out of crowds during flu season (winter).    Ask your healthcare provider if you should get a pneumonia vaccination.  How to wash your hands    Use warm water and plenty of soap. Work up a good lather.    Clean your whole hand, under your nails, between your fingers, and up your wrists. Wash for at least 15 to 20 seconds. Don t just wipe--rub well.    Rinse. Let the water run down your fingertips, not up your wrists.    In a public restroom, use a paper towel to turn off the faucet and open the door.  Date Last Reviewed: 12/1/2016 2000-2019 [employer]. [address] [place] [address], [place] [address]. All rights reserved. This information is not intended as a substitute for professional medical care. Always follow your healthcare professional's instructions.

## 2020-05-18 NOTE — PROGRESS NOTES
Subjective     Chas Scherer is a 54 year old male who presents to clinic today for the following health issues:    HPI       Joint Pain    Onset: for a few days now per pt    Description:   Location: R knee  Character: Sharp, Dull ache and Stabbing    Intensity: severe    Progression of Symptoms: same    Accompanying Signs & Symptoms:  Other symptoms: swelling    History:   Previous similar pain: no       Precipitating factors:   Trauma or overuse: YES- Pt fell mowing the grass on the hill    Alleviating factors:  Improved by: rest/inactivity, heat, ice, immobilization and massage    Therapies Tried and outcome: noted above and helped with swelling per pt.      History of knee surgery? None. No previous injury.  Twisting when fell.     Pop? Didn't hear one per patient.   Had pain immediately.  Swelled within first two hours.   Took yesterday off of work. Works at [employer], usually is on his feet all day.   Is very painful to walk.   Is supposed to work today.     bmi 36.   H/o back pain on gabapentin, flexeril.     [place] fills.     Pain is a little better just from resting.   Has been taking ibuprofen, helps some.   vicodin makes him itch. Tolerates percocet.             Patient Active Problem List   Diagnosis     Gastric bypass status for obesity     CARDIOVASCULAR SCREENING; LDL GOAL LESS THAN 130     Tobacco abuse     Generalized anxiety disorder     [facility]     Left ankle pain     Heel spur     Advanced directives, counseling/discussion     Adjustment disorder with anxious mood     Benign essential hypertension     Pulmonary nodules     Alcohol abuse     Alcohol-induced polyneuropathy (H)     Chronic midline low back pain without sciatica     Gastroesophageal reflux disease, esophagitis presence not specified     Insomnia, unspecified type     History reviewed. No pertinent surgical history.    Social History     Tobacco Use     Smoking status: Current Some Day Smoker     Packs/day: 0.50      Years: 5.00     Pack years: 2.50     Types: Cigarettes     Smokeless tobacco: Former User     Quit date: 2/2/2014   Substance Use Topics     Alcohol use: No     Comment: quit 9-2009     Family History   Problem Relation Age of Onset     Arthritis Mother      Cancer Mother         Lung ca     Alcohol/Drug Father      Diabetes Brother      Alcohol/Drug Brother      Gastrointestinal Disease Brother      Cancer Brother      Cancer Brother         Lung ca     Obesity Brother          Current Outpatient Medications   Medication Sig Dispense Refill     albuterol (PROAIR HFA/PROVENTIL HFA/VENTOLIN HFA) 108 (90 BASE) MCG/ACT Inhaler Inhale 2 puffs into the lungs every 6 hours as needed for shortness of breath / dyspnea or wheezing Needs to be seen before next refill 1 Inhaler 0     Cholecalciferol (VITAMIN D PO) Take 1 tablet by mouth.       cyanocolbalamin (VITAMIN B-12) 500 MCG tablet Take 500 mcg by mouth daily.       cyclobenzaprine (FLEXERIL) 10 MG tablet Take 1 tablet (10 mg) by mouth nightly as needed for muscle spasms Need to be seen for more 30 tablet 0     gabapentin (NEURONTIN) 300 MG capsule 2 tab 3 times a day 180 capsule 0     Ibuprofen (ADVIL PO) Take 200 mg by mouth As needed       lisinopril-hydrochlorothiazide (PRINZIDE/ZESTORETIC) 20-12.5 MG per tablet Take 1 tablet by mouth daily 90 tablet 0     oxyCODONE-acetaminophen (PERCOCET) 5-325 MG tablet Take 1 tablet by mouth every 6 hours as needed for pain 12 tablet 0     sertraline (ZOLOFT) 50 MG tablet Take 1 tablet (50 mg) by mouth daily 90 tablet 1     THIAMINE HCL PO Take 50 mg by mouth daily       traZODone (DESYREL) 50 MG tablet Take 1 tablet (50 mg) by mouth nightly as needed 30 tablet 0     No Known Allergies      Reviewed and updated as needed this visit by Provider         Review of Systems   Constitutional, HEENT, cardiovascular, pulmonary, GI, , musculoskeletal, neuro, skin, endocrine and psych systems are negative, except as otherwise noted.       Objective    /88   Pulse 71   Temp 97.2  F (36.2  C) (Tympanic)   Resp 16   Ht 1.829 m (6')   Wt 122.5 kg (270 lb)   SpO2 100%   BMI 36.62 kg/m    Body mass index is 36.62 kg/m .  Physical Exam   GENERAL: alert, no distress and obese  RESP: lungs clear to auscultation - no rales, rhonchi or wheezes  CV: regular rate and rhythm, normal S1 S2, no S3 or S4, no murmur, click or rub, no peripheral edema and peripheral pulses strong  SKIN: no suspicious lesions or rashes  NEURO: Normal strength and tone, mentation intact and speech normal  PSYCH: mentation appears normal, affect normal/bright  Ortho: R Knee- No erythema.  Significant medial edema. Effusion possible difficult to tell. Xray shows this.   No ecchymosis. No warmth.  Tender to palpation medial joint line and inferior to patella.   Range of motion severely limited including flexion which causes significant pain.  Sensation intact distally.  Distal pulses strong. Hip, knee strength decreased due to pain exam was difficult. .  Anterior drawer sign negative.   McMurrays positive for pain medial region.           Study Result     KNEE RIGHT THREE VIEWS May 19, 2020 9:47 AM      HISTORY: Fell, lots of pain. Acute pain of right knee.                                                                      IMPRESSION: Moderate osteoarthrosis in the medial and patellofemoral  compartments. Mild osteoarthrosis in the lateral compartment. Small  effusion. Calcified intra-articular body in a popliteal cyst. No  evidence of fracture.             Assessment & Plan     1. Acute pain of right knee  Has arthritis but doesn't explain acute pain  I am concerned for medial meniscal tear or other soft tissue injury  See more below  See letters for workability  Will either need ortho or physical therapy f/u depending on MRI result    - XR Knee Right 3 Views; Future  - MR Knee Right w/o Contrast; Future  - oxyCODONE-acetaminophen (PERCOCET) 5-325 MG tablet; Take 1 tablet  by mouth every 6 hours as needed for pain  Dispense: 12 tablet; Refill: 0    2. Screen for colon cancer  Come back for physical            Patient Instructions   Xray is negative. I will follow up with radiologist read and we will call you if anything else is seen on x-ray.     Be seen again if symptoms worsen or change    Use crutches to avoid weight bearing    Call [facility] to schedule MRI of knee [phone] , I will follow up with results    Continue ibuprofen 800 mg with food every 8 hours    -Narcotic medications can be addicting and therefore are used for short term pain control only.  They are not intended for long-term use.    -Take them only for severe pain as needed.    -Never mix with alcohol.    -Do not drive after taking this medication.    -No refills without an office visit. No replacements will be given.    -Keep these medications kept in a safe location.  You are responsible for them.  -Do not give them to anyone else.  They are prescribed to you only.     Come back in a month for a physical (in July)          Return in about 1 week (around 5/26/2020) for if not improving.    Amanda Kruger PA-C  [facility]

## 2020-05-19 ENCOUNTER — OFFICE VISIT (OUTPATIENT)
Dept: FAMILY MEDICINE | Facility: CLINIC | Age: 55
End: 2020-05-19
Payer: COMMERCIAL

## 2020-05-19 ENCOUNTER — ANCILLARY PROCEDURE (OUTPATIENT)
Dept: GENERAL RADIOLOGY | Facility: CLINIC | Age: 55
End: 2020-05-19
Attending: PHYSICIAN ASSISTANT
Payer: COMMERCIAL

## 2020-05-19 VITALS
DIASTOLIC BLOOD PRESSURE: 88 MMHG | TEMPERATURE: 97.2 F | HEART RATE: 71 BPM | OXYGEN SATURATION: 100 % | BODY MASS INDEX: 36.57 KG/M2 | RESPIRATION RATE: 16 BRPM | HEIGHT: 72 IN | WEIGHT: 270 LBS | SYSTOLIC BLOOD PRESSURE: 128 MMHG

## 2020-05-19 DIAGNOSIS — Z12.11 SCREEN FOR COLON CANCER: ICD-10-CM

## 2020-05-19 DIAGNOSIS — M25.561 ACUTE PAIN OF RIGHT KNEE: ICD-10-CM

## 2020-05-19 DIAGNOSIS — M25.561 ACUTE PAIN OF RIGHT KNEE: Primary | ICD-10-CM

## 2020-05-19 PROCEDURE — 73562 X-RAY EXAM OF KNEE 3: CPT | Mod: RT

## 2020-05-19 PROCEDURE — 99214 OFFICE O/P EST MOD 30 MIN: CPT | Performed by: PHYSICIAN ASSISTANT

## 2020-05-19 RX ORDER — OXYCODONE AND ACETAMINOPHEN 5; 325 MG/1; MG/1
1 TABLET ORAL EVERY 6 HOURS PRN
Qty: 12 TABLET | Refills: 0 | Status: SHIPPED | OUTPATIENT
Start: 2020-05-19 | End: 2020-05-20

## 2020-05-19 ASSESSMENT — MIFFLIN-ST. JEOR: SCORE: 2097.71

## 2020-05-19 NOTE — LETTER
[facility]  [address] [facility] [address]  Phone: [phone]    May 19, 2020        Chas Scherer  [address] [facility] [address]          To whom it may concern:    RE: Chas Scherer    Patient was seen and treated today at our clinic and missed work for acute injury.   They may be excused 5-18-20 through 5-24-20 and return when symptoms improve.     Please contact me for questions or concerns.      Sincerely,        Amanda Kruger PA-C

## 2020-05-20 ENCOUNTER — MYC REFILL (OUTPATIENT)
Dept: FAMILY MEDICINE | Facility: CLINIC | Age: 55
End: 2020-05-20

## 2020-05-20 DIAGNOSIS — M25.561 ACUTE PAIN OF RIGHT KNEE: ICD-10-CM

## 2020-05-21 RX ORDER — OXYCODONE AND ACETAMINOPHEN 5; 325 MG/1; MG/1
1 TABLET ORAL EVERY 6 HOURS PRN
Qty: 12 TABLET | Refills: 0 | Status: SHIPPED | OUTPATIENT
Start: 2020-05-21 | End: 2021-03-25

## 2020-05-21 NOTE — TELEPHONE ENCOUNTER
Controlled Substance Refill Request for percocet  Problem List Complete:  No     Last Written Prescription Date:  5/19/2020  Last Fill Quantity: 12,   # refills: 0    Last Office Visit with [facility] primary care provider: 5/19/2020    Future Office visit:     Controlled substance agreement:   Encounter-Level CSA:    There are no encounter-level csa.     Patient-Level CSA:    There are no patient-level csa.         Last Urine Drug Screen: No results found for: CDAUT, No results found for: COMDAT, No results found for: THC13, PCP13, COC13, MAMP13, OPI13, AMP13, BZO13, TCA13, MTD13, BAR13, OXY13, PPX13, BUP13     Processing:  Rx to be electronically transmitted to pharmacy by provider      [email or web address]       checked in past 3 months?  Yes 5/21/2020 no concerns  Katy LLANOSN, RN

## 2020-05-21 NOTE — TELEPHONE ENCOUNTER
Have him make that mri appointment now and the f/u with ortho I will place referral. He needs to see ortho asap.   His insurance may not cover more pain medications.     Amanda Kruger PA-C

## 2020-11-22 ENCOUNTER — HEALTH MAINTENANCE LETTER (OUTPATIENT)
Age: 55
End: 2020-11-22

## 2020-12-08 NOTE — PATIENT INSTRUCTIONS
HISTORY OF PRESENT ILLNESS:  Patient presents complaining of pain with urination that started at Thanksgiving. She has noticed some gross blood in her urine. No fevers or chills. No back pain or flank pain. No nausea or vomiting. PAST MEDICAL HISTORY:    Pre-eclampsia                                                 ALLERGIES:  No Known Allergies  Social History     Socioeconomic History   â¢ Marital status: Single     Spouse name: Not on file   â¢ Number of children: Not on file   â¢ Years of education: Not on file   â¢ Highest education level: Not on file   Occupational History   â¢ Not on file   Social Needs   â¢ Financial resource strain: Not on file   â¢ Food insecurity     Worry: Not on file     Inability: Not on file   â¢ Transportation needs     Medical: Not on file     Non-medical: Not on file   Tobacco Use   â¢ Smoking status: Former Smoker   â¢ Smokeless tobacco: Never Used   Substance and Sexual Activity   â¢ Alcohol use: Not Currently   â¢ Drug use: Not Currently   â¢ Sexual activity: Not Currently     Partners: Male   Lifestyle   â¢ Physical activity     Days per week: Not on file     Minutes per session: Not on file   â¢ Stress: Not on file   Relationships   â¢ Social connections     Talks on phone: Not on file     Gets together: Not on file     Attends Orthodox service: Not on file     Active member of club or organization: Not on file     Attends meetings of clubs or organizations: Not on file     Relationship status: Not on file   â¢ Intimate partner violence     Fear of current or ex partner: Not on file     Emotionally abused: Not on file     Physically abused: Not on file     Forced sexual activity: Not on file   Other Topics Concern   â¢ Not on file   Social History Narrative   â¢ Not on file         REVIEW OF SYSTEMS:   14 point review of systems negative except as noted in the history of present illness.       PHYSICAL EXAMINATION:   Visit Vitals  /68 (BP Location: LUE - Left upper extremity, Please remember to call and schedule a follow up appointment if one was recommended at your earliest convenience.  Orthopedics CLINIC HOURS TELEPHONE NUMBER   Dr. Curt Olivier  Certified Medical Assistant   Monday & Wednesday   8am - 5pm  Thursday 1pm - 5pm  Friday 8am -11:30am Specialty schedulers:   [phone] to schedule your surgery.  Main Clinic:   [phone] to make an appointment with any provider.    Urgent Care locations:    [facility] Monday-Friday Closed  Saturday-Sunday 9am-5pm      Monday-Friday 12pm - 8pm  Saturday-Sunday 9am-5pm [phone]     If SURGERY has been recommended, please call our Specialty Schedulers at [phone] to schedule your procedure.    If you need a medication refill, please contact your pharmacy. Please allow 3 business days for your refill to be completed.    If an MRI or CT scan has been recommended, please call [facility] Schedulers at [phone] to schedule your appointment.  Use [employer] (secure e-mail communication and access to your chart) to send a message or to make an appointment. Please ask how you can sign up for [employer]  Your care team's suggested websites for health information:   [email or web address] : Up to date and easily searchable information on multiple topics.   [email or web address] : [place] dept of heat, public health issues in [place], N1N1       "Patient Position: Sitting, Cuff Size: Large Adult)   Pulse 78   Temp 97.2 Â°F (36.2 Â°C) (Temporal)   Resp 17   Ht 5' 1"" (1.549 m)   Wt 102.1 kg   LMP 09/28/2020   SpO2 97%   BMI 42.51 kg/mÂ²     General:  Well-developed, well-nourished, no acute distress. Eyes: Normal lids, conjunctivae clear bilaterally. PERRL(Pupils equal, round, reactive to light). ENT: External ears normal, nares symmetric, no discharge. Moist mucous membranes. Respiratory: Normal respiratory effort, lungs clear to auscultation bilaterally  Cardiovascular: Regular rate and rhythm, no murmurs. No LE(lower extremity) edema. Gastrointestinal: Abdomen soft, nontender, non-distended. Active bowel sounds. Skin: Warm, no rashes or lesions. No palpable nodules. ASSESSMENT AND PLAN:  UTI. Patient's urine dip showed blood and leukocytes. Urine sent for culture. She was started on Macrobid. She was instructed to push clear liquids.   Follow-up as needed      Ethel Bonilla MD  12/8/2020    "

## 2021-03-25 ENCOUNTER — OFFICE VISIT (OUTPATIENT)
Dept: FAMILY MEDICINE | Facility: CLINIC | Age: 56
End: 2021-03-25
Payer: COMMERCIAL

## 2021-03-25 VITALS
HEIGHT: 72 IN | RESPIRATION RATE: 16 BRPM | SYSTOLIC BLOOD PRESSURE: 135 MMHG | TEMPERATURE: 97.7 F | OXYGEN SATURATION: 99 % | HEART RATE: 68 BPM | BODY MASS INDEX: 35.89 KG/M2 | DIASTOLIC BLOOD PRESSURE: 79 MMHG | WEIGHT: 265 LBS

## 2021-03-25 DIAGNOSIS — F43.22 ADJUSTMENT DISORDER WITH ANXIOUS MOOD: ICD-10-CM

## 2021-03-25 DIAGNOSIS — G47.00 INSOMNIA, UNSPECIFIED TYPE: ICD-10-CM

## 2021-03-25 DIAGNOSIS — Z12.12 SCREENING FOR MALIGNANT NEOPLASM OF THE RECTUM: ICD-10-CM

## 2021-03-25 DIAGNOSIS — Z00.00 ROUTINE GENERAL MEDICAL EXAMINATION AT A HEALTH CARE FACILITY: Primary | ICD-10-CM

## 2021-03-25 DIAGNOSIS — G62.1 ALCOHOL-INDUCED POLYNEUROPATHY (H): ICD-10-CM

## 2021-03-25 LAB
ALBUMIN SERPL-MCNC: 3.9 G/DL (ref 3.4–5)
ALP SERPL-CCNC: 76 U/L (ref 40–150)
ALT SERPL W P-5'-P-CCNC: 19 U/L (ref 0–70)
ANION GAP SERPL CALCULATED.3IONS-SCNC: 4 MMOL/L (ref 3–14)
AST SERPL W P-5'-P-CCNC: 12 U/L (ref 0–45)
BILIRUB SERPL-MCNC: 0.9 MG/DL (ref 0.2–1.3)
BUN SERPL-MCNC: 21 MG/DL (ref 7–30)
CALCIUM SERPL-MCNC: 8.8 MG/DL (ref 8.5–10.1)
CHLORIDE SERPL-SCNC: 106 MMOL/L (ref 94–109)
CHOLEST SERPL-MCNC: 143 MG/DL
CO2 SERPL-SCNC: 29 MMOL/L (ref 20–32)
CREAT SERPL-MCNC: 0.9 MG/DL (ref 0.66–1.25)
GFR SERPL CREATININE-BSD FRML MDRD: >90 ML/MIN/{1.73_M2}
GLUCOSE SERPL-MCNC: 98 MG/DL (ref 70–99)
HDLC SERPL-MCNC: 55 MG/DL
LDLC SERPL CALC-MCNC: 77 MG/DL
NONHDLC SERPL-MCNC: 88 MG/DL
POTASSIUM SERPL-SCNC: 4.6 MMOL/L (ref 3.4–5.3)
PROT SERPL-MCNC: 7.1 G/DL (ref 6.8–8.8)
PSA SERPL-ACNC: 0.21 UG/L (ref 0–4)
SODIUM SERPL-SCNC: 139 MMOL/L (ref 133–144)
TRIGL SERPL-MCNC: 55 MG/DL

## 2021-03-25 PROCEDURE — G0103 PSA SCREENING: HCPCS | Performed by: PHYSICIAN ASSISTANT

## 2021-03-25 PROCEDURE — 80053 COMPREHEN METABOLIC PANEL: CPT | Performed by: PHYSICIAN ASSISTANT

## 2021-03-25 PROCEDURE — 36415 COLL VENOUS BLD VENIPUNCTURE: CPT | Performed by: PHYSICIAN ASSISTANT

## 2021-03-25 PROCEDURE — 99213 OFFICE O/P EST LOW 20 MIN: CPT | Mod: 25 | Performed by: PHYSICIAN ASSISTANT

## 2021-03-25 PROCEDURE — 99396 PREV VISIT EST AGE 40-64: CPT | Performed by: PHYSICIAN ASSISTANT

## 2021-03-25 PROCEDURE — 80061 LIPID PANEL: CPT | Performed by: PHYSICIAN ASSISTANT

## 2021-03-25 RX ORDER — GABAPENTIN 300 MG/1
CAPSULE ORAL
Qty: 180 CAPSULE | Refills: 1 | Status: SHIPPED | OUTPATIENT
Start: 2021-03-25

## 2021-03-25 ASSESSMENT — ENCOUNTER SYMPTOMS
PARESTHESIAS: 0
CHILLS: 0
JOINT SWELLING: 0
SORE THROAT: 0
FREQUENCY: 0
FEVER: 0
HEMATOCHEZIA: 0
SHORTNESS OF BREATH: 0
CONSTIPATION: 0
ARTHRALGIAS: 1
WEAKNESS: 0
DIZZINESS: 0
PALPITATIONS: 0
DYSURIA: 0
HEMATURIA: 0
EYE PAIN: 0
NERVOUS/ANXIOUS: 0
COUGH: 0
DIARRHEA: 0
ABDOMINAL PAIN: 0
HEADACHES: 0
MYALGIAS: 1
HEARTBURN: 0
NAUSEA: 0

## 2021-03-25 ASSESSMENT — MIFFLIN-ST. JEOR: SCORE: 2070.03

## 2021-03-25 NOTE — PROGRESS NOTES
"  3  SUBJECTIVE:   CC: Chas Scherer is an 55 year old male who presents for preventive health visit.     {Split Bill scripting  The purpose of this visit is to discuss your medical history and prevent health problems before you are sick. You may be responsible for a co-pay, coinsurance, or deductible if your visit today includes services such as checking on a sore throat, having an x-ray or lab test, or treating and evaluating a new or existing condition :883486}  Patient has been advised of split billing requirements and indicates understanding: {Yes and No:844458}  Healthy Habits:    Do you get at least three servings of calcium containing foods daily (dairy, green leafy vegetables, etc.)? { :992312::\"yes\"}    Amount of exercise or daily activities, outside of work: { :029722}    Problems taking medications regularly { :946257::\"No\"}    Medication side effects: { :932781::\"No\"}    Have you had an eye exam in the past two years? { :183950}    Do you see a dentist twice per year? { :052270}    Do you have sleep apnea, excessive snoring or daytime drowsiness?{ :537646}  {Outside tests to abstract? :506911}    {additional problems to add (Optional):459572}    Today's PHQ-2 Score:   PHQ-2 ( 1999 [employer]) 5/19/2020 6/24/2016   Q1: Little interest or pleasure in doing things 0 0   Q2: Feeling down, depressed or hopeless 0 0   PHQ-2 Score 0 0     {PHQ-2 LOOK IN ASSESSMENTS (Optional) :639196}  Abuse: Current or Past(Physical, Sexual or Emotional)- {YES/NO/NA:089372}  Do you feel safe in your environment? {YES/NO/NA:286305}    Have you ever done Advance Care Planning? (For example, a Health Directive, POLST, or a discussion with a medical provider or your loved ones about your wishes): { :363612}    Social History     Tobacco Use     Smoking status: Current Some Day Smoker     Packs/day: 0.50     Years: 5.00     Pack years: 2.50     Types: Cigarettes     Smokeless tobacco: Former User     Quit date: 2/2/2014 " "  Substance Use Topics     Alcohol use: No     Comment: quit 9-2009     If you drink alcohol do you typically have >3 drinks per day or >7 drinks per week? {ETOH :954654}                      Last PSA: No results found for: PSA    Reviewed orders with patient. Reviewed health maintenance and updated orders accordingly - {Yes/No:630413::\"Yes\"}  {Chronicprobdata (Optional):670547}    Reviewed and updated as needed this visit by clinical staff                 Reviewed and updated as needed this visit by Provider                {HISTORY OPTIONS (Optional):374219}    ROS:  { :646661::\"CONSTITUTIONAL: NEGATIVE for fever, chills, change in weight\",\"INTEGUMENTARY/SKIN: NEGATIVE for worrisome rashes, moles or lesions\",\"EYES: NEGATIVE for vision changes or irritation\",\"ENT: NEGATIVE for ear, mouth and throat problems\",\"RESP: NEGATIVE for significant cough or SOB\",\"CV: NEGATIVE for chest pain, palpitations or peripheral edema\",\"GI: NEGATIVE for nausea, abdominal pain, heartburn, or change in bowel habits\",\" male: negative for dysuria, hematuria, decreased urinary stream, erectile dysfunction, urethral discharge\",\"MUSCULOSKELETAL: NEGATIVE for significant arthralgias or myalgia\",\"NEURO: NEGATIVE for weakness, dizziness or paresthesias\",\"PSYCHIATRIC: NEGATIVE for changes in mood or affect\"}    OBJECTIVE:   There were no vitals taken for this visit.  EXAM:  {Exam Choices:332891}    {Diagnostic Test Results (Optional):790517::\"Diagnostic Test Results:\",\"Labs reviewed in Epic\"}    ASSESSMENT/PLAN:   {Diag Picklist:825908}    Patient has been advised of split billing requirements and indicates understanding: {YES / NO:206754::\"Yes\"}  COUNSELING:  {MALE COUNSELING MESSAGES:732536::\"Reviewed preventive health counseling, as reflected in patient instructions\"}    Estimated body mass index is 36.62 kg/m  as calculated from the following:    Height as of 5/19/20: 1.829 m (6').    Weight as of 5/19/20: 122.5 kg (270 lb).    {Weight " Management Plan (ACO) Complete if BMI is abnormal-  Ages 18-64  BMI >24.9.  Age 65+ with BMI <23 or >30 (Optional):290097}    He reports that he has been smoking cigarettes. He has a 2.50 pack-year smoking history. He quit smokeless tobacco use about 7 years ago.  Tobacco Cessation Action Plan:   {TOBACCO CESSATION ACTION PLAN:262379}      Counseling Resources:  ATP IV Guidelines  Pooled Cohorts Equation Calculator  FRAX Risk Assessment  ICSI Preventive Guidelines  Dietary Guidelines for Americans, 2010  USDA's MyPlate  ASA Prophylaxis  Lung CA Screening    NAUN Muñiz [facility]

## 2021-03-25 NOTE — PROGRESS NOTES
SUBJECTIVE:   CC: Chas Scherer is an 55 year old male who presents for preventative health visit.     Patient has been advised of split billing requirements and indicates understanding: Yes  Healthy Habits:     Getting at least 3 servings of Calcium per day:  NO    Bi-annual eye exam:  NO    Dental care twice a year:  NO    Sleep apnea or symptoms of sleep apnea:  None    Diet:  Regular (no restrictions)    Frequency of exercise:  None    Taking medications regularly:  Yes    Medication side effects:  None    PHQ-2 Total Score: 0    Additional concerns today:  No    Was seeing [facility] but now insurance changed:     Needs meds refilled -  On zoloft for anxiety / depression- Ran out of medications about 4-6 yrs   Increase stress with brother with lung cancer and wife with MS.   occ take trazodone for sleep.   History of ETOH abuse and sober for 5 yrs. History of seizure due to ETOH usage and need drivers form filled out.     Smoker: 2004 1 ppd.     Today's PHQ-2 Score:   PHQ-2 ( 1999 [employer]) 3/25/2021   Q1: Little interest or pleasure in doing things 0   Q2: Feeling down, depressed or hopeless 0   PHQ-2 Score 0   Q1: Little interest or pleasure in doing things Not at all   Q2: Feeling down, depressed or hopeless Not at all   PHQ-2 Score 0       Abuse: Current or Past(Physical, Sexual or Emotional)- No  Do you feel safe in your environment? Yes    Have you ever done Advance Care Planning? (For example, a Health Directive, POLST, or a discussion with a medical provider or your loved ones about your wishes): No, advance care planning information given to patient to review.  Patient plans to discuss their wishes with loved ones or provider.      Social History     Tobacco Use     Smoking status: Current Some Day Smoker     Packs/day: 0.50     Years: 5.00     Pack years: 2.50     Types: Cigarettes     Smokeless tobacco: Former User     Quit date: 2/2/2014   Substance Use Topics     Alcohol use: No     Comment: quit  9-2009         Alcohol Use 3/25/2021   Prescreen: >3 drinks/day or >7 drinks/week? Not Applicable   Prescreen: >3 drinks/day or >7 drinks/week? -       Last PSA: No results found for: PSA    Reviewed orders with patient. Reviewed health maintenance and updated orders accordingly - Yes  Lab work is in process  Labs reviewed in EPIC  BP Readings from Last 3 Encounters:   03/25/21 135/79   05/19/20 128/88   02/07/20 (!) 153/89    Wt Readings from Last 3 Encounters:   03/25/21 120.2 kg (265 lb)   05/19/20 122.5 kg (270 lb)   09/18/17 (!) 137 kg (302 lb)                  Patient Active Problem List   Diagnosis     Gastric bypass status for obesity     CARDIOVASCULAR SCREENING; LDL GOAL LESS THAN 130     Tobacco abuse     Generalized anxiety disorder     [facility]     Left ankle pain     Heel spur     Advanced directives, counseling/discussion     Adjustment disorder with anxious mood     Benign essential hypertension     Pulmonary nodules     Alcohol abuse     Alcohol-induced polyneuropathy (H)     Chronic midline low back pain without sciatica     Gastroesophageal reflux disease, esophagitis presence not specified     Insomnia, unspecified type     BMI 35.0-35.9,adult     History reviewed. No pertinent surgical history.    Social History     Tobacco Use     Smoking status: Current Some Day Smoker     Packs/day: 0.50     Years: 5.00     Pack years: 2.50     Types: Cigarettes     Smokeless tobacco: Former User     Quit date: 2/2/2014   Substance Use Topics     Alcohol use: No     Comment: quit 9-2009     Family History   Problem Relation Age of Onset     Arthritis Mother      Cancer Mother         Lung ca     Alcohol/Drug Father      Diabetes Brother      Alcohol/Drug Brother      Gastrointestinal Disease Brother      Cancer Brother      Cancer Brother         Lung ca     Obesity Brother          Current Outpatient Medications   Medication Sig Dispense Refill     Cholecalciferol (VITAMIN D PO) Take 1 tablet by  mouth.       cyanocolbalamin (VITAMIN B-12) 500 MCG tablet Take 500 mcg by mouth daily.       cyclobenzaprine (FLEXERIL) 10 MG tablet Take 1 tablet (10 mg) by mouth nightly as needed for muscle spasms Need to be seen for more 30 tablet 0     gabapentin (NEURONTIN) 300 MG capsule 2 tab 3 times a day 180 capsule 1     Ibuprofen (ADVIL PO) Take 200 mg by mouth As needed       Multiple Vitamin (MULTIVITAMIN ADULT PO)        oxyCODONE-acetaminophen (PERCOCET) 5-325 MG tablet Take 1 tablet by mouth every 6 hours as needed for pain 12 tablet 0     sertraline (ZOLOFT) 50 MG tablet Take 1 tablet (50 mg) by mouth daily 90 tablet 1     THIAMINE HCL PO Take 50 mg by mouth daily       traZODone (DESYREL) 50 MG tablet Take 1 tablet (50 mg) by mouth nightly as needed 30 tablet 0     No Known Allergies    Reviewed and updated as needed this visit by clinical staff  Tobacco  Allergies  Meds  Problems  Med Hx  Surg Hx  Fam Hx  Soc Hx          Reviewed and updated as needed this visit by Provider  Tobacco  Allergies  Meds  Problems  Med Hx  Surg Hx  Fam Hx           Past Medical History:   Diagnosis Date     Acute gouty arthritis 7/10/2017     Alcohol withdrawal syndrome (H) 5/23/2016     Chest pain, unspecified type 6/24/2016     Labral tear of shoulder 12/13/2011     Shoulder impingement syndrome 10/8/2009      History reviewed. No pertinent surgical history.    Review of Systems   Constitutional: Negative for chills and fever.   HENT: Negative for congestion, ear pain, hearing loss and sore throat.    Eyes: Negative for pain and visual disturbance.   Respiratory: Negative for cough and shortness of breath.    Cardiovascular: Positive for peripheral edema. Negative for chest pain and palpitations.   Gastrointestinal: Negative for abdominal pain, constipation, diarrhea, heartburn, hematochezia and nausea.   Genitourinary: Negative for discharge, dysuria, frequency, genital sores, hematuria, impotence and urgency.    Musculoskeletal: Positive for arthralgias and myalgias. Negative for joint swelling.   Skin: Negative for rash.   Neurological: Negative for dizziness, weakness, headaches and paresthesias.   Psychiatric/Behavioral: Positive for mood changes. The patient is not nervous/anxious.          OBJECTIVE:   /79   Pulse 68   Temp 97.7  F (36.5  C) (Tympanic)   Resp 16   Ht 1.829 m (6')   Wt 120.2 kg (265 lb)   SpO2 99%   BMI 35.94 kg/m      Physical Exam  GENERAL: healthy, alert and no distress  EYES: Eyes grossly normal to inspection, PERRL and conjunctivae and sclerae normal  HENT: ear canals and TM's normal, nose and mouth without ulcers or lesions  NECK: no adenopathy, no asymmetry, masses, or scars and thyroid normal to palpation  RESP: lungs clear to auscultation - no rales, rhonchi or wheezes  CV: regular rate and rhythm, normal S1 S2, no S3 or S4, no murmur, click or rub, no peripheral edema and peripheral pulses strong  ABDOMEN: soft, nontender, no hepatosplenomegaly, no masses and bowel sounds normal   (male): normal male genitalia without lesions or urethral discharge, no hernia  MS: no gross musculoskeletal defects noted, no edema  SKIN: no suspicious lesions or rashes  NEURO: Normal strength and tone, mentation intact and speech normal  PSYCH: mentation appears normal, affect normal/bright    Diagnostic Test Results:  Labs reviewed in Epic    ASSESSMENT/PLAN:       ICD-10-CM    1. Routine general medical examination at a health care facility  Z00.00 Lipid panel reflex to direct LDL Fasting     Comprehensive metabolic panel (BMP + Alb, Alk Phos, ALT, AST, Total. Bili, TP)     PSA, screen   2. Alcohol-induced polyneuropathy (H)  G62.1 gabapentin (NEURONTIN) 300 MG capsule     OFFICE/OUTPT VISIT,EST,LEVL III   3. Adjustment disorder with anxious mood  F43.22 sertraline (ZOLOFT) 50 MG tablet     OFFICE/OUTPT VISIT,EST,LEVL III   4. Insomnia, unspecified type  G47.00 OFFICE/OUTPT VISIT,EST,LEVL III    5. BMI 35.0-35.9,adult  Z68.35    6. Screening for malignant neoplasm of the rectum  Z12.12 GASTROENTEROLOGY ADULT REF PROCEDURE ONLY   labs pending. Work on Healthy diet and exercise. Getting heart rate elevated for 30 mins most days of week.  meds refilled.   Recheck 6 months dependant on labs.     Patient has been advised of split billing requirements and indicates understanding: Yes  COUNSELING:   Reviewed preventive health counseling, as reflected in patient instructions       Regular exercise       Healthy diet/nutrition       Vision screening    Estimated body mass index is 35.94 kg/m  as calculated from the following:    Height as of this encounter: 1.829 m (6').    Weight as of this encounter: 120.2 kg (265 lb).     Weight management plan: Discussed healthy diet and exercise guidelines    He reports that he has been smoking cigarettes. He has a 2.50 pack-year smoking history. He quit smokeless tobacco use about 7 years ago.  Tobacco Cessation Action Plan:   Information offered: Patient not interested at this time      Counseling Resources:  ATP IV Guidelines  Pooled Cohorts Equation Calculator  FRAX Risk Assessment  ICSI Preventive Guidelines  Dietary Guidelines for Americans, 2010  USDA's MyPlate  ASA Prophylaxis  Lung CA Screening    Ke Hope PA-C  [facility]

## 2021-03-26 NOTE — RESULT ENCOUNTER NOTE
Mr. Scherer,    All of your labs were normal/near normal for you.    Please contact the clinic if you have additional questions.  Thank you.    Sincerely,    Ke Hope PA-C

## 2021-05-10 ENCOUNTER — MYC REFILL (OUTPATIENT)
Dept: FAMILY MEDICINE | Facility: CLINIC | Age: 56
End: 2021-05-10

## 2021-05-10 DIAGNOSIS — M54.50 CHRONIC LOW BACK PAIN WITHOUT SCIATICA, UNSPECIFIED BACK PAIN LATERALITY: ICD-10-CM

## 2021-05-10 DIAGNOSIS — G89.29 CHRONIC LOW BACK PAIN WITHOUT SCIATICA, UNSPECIFIED BACK PAIN LATERALITY: ICD-10-CM

## 2021-05-10 DIAGNOSIS — G47.00 INSOMNIA, UNSPECIFIED TYPE: ICD-10-CM

## 2021-05-11 RX ORDER — TRAZODONE HYDROCHLORIDE 50 MG/1
50 TABLET, FILM COATED ORAL
Qty: 30 TABLET | Refills: 3 | Status: SHIPPED | OUTPATIENT
Start: 2021-05-11

## 2021-05-11 RX ORDER — CYCLOBENZAPRINE HCL 10 MG
10 TABLET ORAL
Qty: 30 TABLET | Refills: 0 | Status: SHIPPED | OUTPATIENT
Start: 2021-05-11

## 2021-05-11 NOTE — TELEPHONE ENCOUNTER
Routing refill request to provider for review/approval because:  Drug not on the [facility] refill protocol  mir Piedra BSN, RN

## 2021-09-19 ENCOUNTER — HEALTH MAINTENANCE LETTER (OUTPATIENT)
Age: 56
End: 2021-09-19

## 2022-02-17 PROBLEM — K21.9 GASTROESOPHAGEAL REFLUX DISEASE: Chronic | Status: ACTIVE | Noted: 2017-06-14

## 2022-04-30 ENCOUNTER — HEALTH MAINTENANCE LETTER (OUTPATIENT)
Age: 57
End: 2022-04-30

## 2022-11-20 ENCOUNTER — HEALTH MAINTENANCE LETTER (OUTPATIENT)
Age: 57
End: 2022-11-20

## 2023-01-06 ENCOUNTER — OFFICE VISIT (OUTPATIENT)
Dept: URGENT CARE | Facility: URGENT CARE | Age: 58
End: 2023-01-06

## 2023-01-06 VITALS
BODY MASS INDEX: 34.53 KG/M2 | OXYGEN SATURATION: 98 % | DIASTOLIC BLOOD PRESSURE: 91 MMHG | HEART RATE: 78 BPM | WEIGHT: 254.6 LBS | TEMPERATURE: 98.5 F | SYSTOLIC BLOOD PRESSURE: 161 MMHG

## 2023-01-06 DIAGNOSIS — S29.9XXA TRAUMATIC INJURY OF RIB: Primary | ICD-10-CM

## 2023-01-06 PROCEDURE — 99213 OFFICE O/P EST LOW 20 MIN: CPT | Performed by: PHYSICIAN ASSISTANT

## 2023-01-06 RX ORDER — OXYCODONE AND ACETAMINOPHEN 5; 325 MG/1; MG/1
1 TABLET ORAL EVERY 6 HOURS PRN
Qty: 12 TABLET | Refills: 0 | Status: SHIPPED | OUTPATIENT
Start: 2023-01-06 | End: 2023-01-09

## 2023-01-06 RX ORDER — BACLOFEN 10 MG/1
1 TABLET ORAL PRN
COMMUNITY
Start: 2022-10-21

## 2023-01-06 ASSESSMENT — ENCOUNTER SYMPTOMS
DYSURIA: 0
WHEEZING: 0
COLOR CHANGE: 0
WOUND: 0
BLOOD IN STOOL: 0
CHEST TIGHTNESS: 0
DIARRHEA: 0
HEADACHES: 0
FREQUENCY: 0
SHORTNESS OF BREATH: 1
COUGH: 0
MYALGIAS: 0
CHILLS: 0
BACK PAIN: 0
VOMITING: 0
FEVER: 0
HEMATURIA: 0
FATIGUE: 0
CONSTIPATION: 0
NAUSEA: 0
DIFFICULTY URINATING: 0
FLANK PAIN: 0
NUMBNESS: 0
ABDOMINAL PAIN: 1
ACTIVITY CHANGE: 1
WEAKNESS: 0

## 2023-01-06 NOTE — PROGRESS NOTES
SUBJECTIVE:   Chas Scherer is a 57 year old male presenting with a chief  complaint of left rib pain for 2 days now. Patient states he fell on cement stairs at home on his left side. Patient denies hitting head or losing consciousness on the stairs. He also denies any bleeding or bruising at the time of the fall and after. Patient has been working following the injury. Patient has tried Ibuprofen/Tylenol but found no relief. Patient denies any alcohol use for years now. Patient states breathing is limited due to pain.   Chief Complaint   Patient presents with     side injury     Fell and hurt ribs/ RTside. Pt slipped on the stairs and landed on side. No bruising but painful. Pt taken ibuprofen and tylenol for pain, hasn't helped much.        He is an established patient of [facility].      Review of Systems   Constitutional: Positive for activity change. Negative for chills, fatigue and fever.   Respiratory: Positive for shortness of breath. Negative for cough, chest tightness and wheezing.    Cardiovascular: Negative for chest pain.   Gastrointestinal: Positive for abdominal pain. Negative for blood in stool, constipation, diarrhea, nausea and vomiting.   Genitourinary: Negative for decreased urine volume, difficulty urinating, dysuria, flank pain, frequency, hematuria and urgency.   Musculoskeletal: Negative for back pain and myalgias.   Skin: Negative for color change, rash and wound.   Neurological: Negative for weakness, numbness and headaches.       Past Medical History:   Diagnosis Date     Acute gouty arthritis 7/10/2017     Alcohol withdrawal syndrome (H) 5/23/2016     Chest pain, unspecified type 6/24/2016     Labral tear of shoulder 12/13/2011     Shoulder impingement syndrome 10/8/2009     Family History   Problem Relation Age of Onset     Arthritis Mother      Cancer Mother         Lung ca     Alcohol/Drug Father      Diabetes Brother      Alcohol/Drug Brother      Gastrointestinal Disease Brother       Cancer Brother      Cancer Brother         Lung ca     Obesity Brother      Current Outpatient Medications   Medication Sig Dispense Refill     baclofen (LIORESAL) 10 MG tablet Take 1 tablet by mouth as needed       Cholecalciferol (VITAMIN D PO) Take 1 tablet by mouth.       cyanocolbalamin (VITAMIN B-12) 500 MCG tablet Take 500 mcg by mouth daily.       Ibuprofen (ADVIL PO) Take 200 mg by mouth As needed       Multiple Vitamin (MULTIVITAMIN ADULT PO)        oxyCODONE-acetaminophen (PERCOCET) 5-325 MG tablet Take 1 tablet by mouth every 6 hours as needed for pain 12 tablet 0     sertraline (ZOLOFT) 50 MG tablet Take 1 tablet (50 mg) by mouth daily 90 tablet 1     traZODone (DESYREL) 50 MG tablet Take 1 tablet (50 mg) by mouth nightly as needed for sleep 30 tablet 3     cyclobenzaprine (FLEXERIL) 10 MG tablet Take 1 tablet (10 mg) by mouth nightly as needed for muscle spasms Need to be seen for more (Patient not taking: Reported on 1/6/2023) 30 tablet 0     gabapentin (NEURONTIN) 300 MG capsule 2 tab 3 times a day (Patient not taking: Reported on 1/6/2023) 180 capsule 1     THIAMINE HCL PO Take 50 mg by mouth daily (Patient not taking: Reported on 1/6/2023)       Social History     Tobacco Use     Smoking status: Every Day     Packs/day: 1.00     Years: 5.00     Pack years: 5.00     Types: Cigarettes     Smokeless tobacco: Former     Quit date: 2/2/2014   Substance Use Topics     Alcohol use: No     Comment: quit 9-2009       OBJECTIVE  BP (!) 161/91 (BP Location: Right arm, Cuff Size: Adult Regular)   Pulse 78   Temp 98.5  F (36.9  C) (Tympanic)   Wt 115.5 kg (254 lb 9.6 oz)   SpO2 98%   BMI 34.53 kg/m      Physical Exam  Constitutional:       General: He is in acute distress.      Appearance: Normal appearance. He is normal weight.      Comments: Patient clutching left ribs    HENT:      Head: Normocephalic and atraumatic.   Cardiovascular:      Rate and Rhythm: Normal rate and regular rhythm.       Pulses: Normal pulses.      Heart sounds: Normal heart sounds.   Pulmonary:      Effort: No respiratory distress.      Breath sounds: Normal breath sounds.   Chest:      Chest wall: No tenderness.   Abdominal:      General: Bowel sounds are normal. There is no distension.      Palpations: Abdomen is soft. There is no mass.      Tenderness: There is abdominal tenderness. There is no guarding or rebound.   Musculoskeletal:         General: Tenderness and signs of injury present. No swelling or deformity. Normal range of motion.      Comments: TTP on AP and lateral compression of left lower ribs. No sign of internal bleeding or puncture wounds.    Skin:     General: Skin is warm and dry.      Coloration: Skin is not pale.      Findings: No bruising, erythema, lesion or rash.   Neurological:      General: No focal deficit present.      Mental Status: He is alert and oriented to person, place, and time. Mental status is at baseline.   Psychiatric:         Mood and Affect: Mood normal.         Behavior: Behavior normal.         Thought Content: Thought content normal.         Judgment: Judgment normal.         Labs:  No results found for this or any previous visit (from the past 24 hour(s)).    X-Ray was not done. XR was offered, patient declined understanding that there would be no change in treatment.     ASSESSMENT:      ICD-10-CM    1. Traumatic injury of rib  S29.9XXA oxyCODONE-acetaminophen (PERCOCET) 5-325 MG tablet           Medical Decision Making:    Differential Diagnosis:  1. Traumatic injury of the left rib   Inclusive: patient clutching left side near ribs in pain. Patient breathing is shallow and limited by pain.     PLAN:     Patient's breathing is limited by pain. Provider explained patient should take slow, deep breaths to prevent atelectasis and pneumonia. Patient was prescribed Percocet for pain relief. Provider warned against driving or drinking alcohol while using this medication and advised patient  to drink plenty of fluids to avoid constipation side effects of medication. Patient was educated about side effects of constipation, drug interactions with alcohol and importance of avoiding alcohol. Provider offered laxatives prescription but patient said he would get one OTC. Patient was offered a work note, but he denied it. Provider asked patient to seek medical attention if symptoms worsen and discussed red flag signs and symptoms with patient. Patient shows understanding.     Followup:    If not improving or if condition worsens, follow up with your Primary Care Provider

## 2023-04-15 ENCOUNTER — HEALTH MAINTENANCE LETTER (OUTPATIENT)
Age: 58
End: 2023-04-15

## 2024-08-31 ENCOUNTER — HEALTH MAINTENANCE LETTER (OUTPATIENT)
Age: 59
End: 2024-08-31